# Patient Record
Sex: FEMALE | Race: WHITE | NOT HISPANIC OR LATINO | Employment: OTHER | URBAN - METROPOLITAN AREA
[De-identification: names, ages, dates, MRNs, and addresses within clinical notes are randomized per-mention and may not be internally consistent; named-entity substitution may affect disease eponyms.]

---

## 2017-01-27 ENCOUNTER — ALLSCRIPTS OFFICE VISIT (OUTPATIENT)
Dept: OTHER | Facility: OTHER | Age: 57
End: 2017-01-27

## 2017-05-10 ENCOUNTER — ALLSCRIPTS OFFICE VISIT (OUTPATIENT)
Dept: OTHER | Facility: OTHER | Age: 57
End: 2017-05-10

## 2017-08-02 ENCOUNTER — ALLSCRIPTS OFFICE VISIT (OUTPATIENT)
Dept: OTHER | Facility: OTHER | Age: 57
End: 2017-08-02

## 2017-11-14 ENCOUNTER — ALLSCRIPTS OFFICE VISIT (OUTPATIENT)
Dept: OTHER | Facility: OTHER | Age: 57
End: 2017-11-14
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
## 2018-01-11 NOTE — MISCELLANEOUS
Message  Pt called states she is shaky/ anxious- "i don't know if I'm having a nervous breakdown"    She is not suicidal- no HI  She has had increase in medical issues  States relationship with spouse has been stable   She does not want to go to hospital  Take 25 mg seroquel now and increase hs dose to 100 mg      Signatures   Electronically signed by : CHRISTEL Morrison; Jan 28 2016  2:08PM EST                       (Author)
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
## 2018-01-13 VITALS
DIASTOLIC BLOOD PRESSURE: 85 MMHG | WEIGHT: 148 LBS | BODY MASS INDEX: 29.84 KG/M2 | HEIGHT: 59 IN | SYSTOLIC BLOOD PRESSURE: 122 MMHG

## 2018-01-15 NOTE — PSYCH
Psych Med Mgmt    Appearance: was calm and cooperative, adequate hygiene and grooming and good eye contact  Observed mood: was dysphoric and anxious  Observed mood: affect appropriate  Speech: a normal rate  Thought processes: coherent/organized  Hallucinations: no hallucinations present  Thought Content: no delusions  Abnormal Thoughts: The patient has no suicidal thoughts and no homicidal thoughts  Orientation: The patient is oriented to person, place and time  Recent and Remote Memory: short term memory intact and long term memory intact  Attention Span And Concentration: concentration intact  Insight: Insight intact  Judgment: Her judgment was intact  Muscle Strength And Tone  Normal gait and station  Treatment Recommendations: Klonopin 1mg 1/2 po bid and 2po qhs  Neurontin 300 mg 3 po qhs  Cymbalta 30 mg po qd  Lamictal 100 mgpo bid  Seroquel 50 mg 1 -2 po qhs  Risks, Benefits And Possible Side Effects Of Medications: Risks, benefits, and possible side effects of medications explained to patient and patient verbalizes understanding  Discussed with patient the risks of sedation, respiratory depression, impairment of ability to drive and potential for abuse and addiction related to treatment with benzodiazepine medications  The patient understands risk of treatment with benzodiazepine medications, agrees to not drive if feels impaired and agrees to take medications as prescribed  She reports normal appetite, normal energy level, no weight change and normal number of sleep hours  Pt seen for follow up Bipolar Disorder/ Anxiety  Pt continues with anxiety and multiple stressors  She continues with pain from knee surgery and difficulty with ambulation  She states she was released from physical therapy in May  Her spouse is back in the house and she is handling things    She states he has not been drinking recently but has some memory issues which frustrates her  Overall she reports she is feeling better with cymblata  Some dry mouth as side effect but otherwise no side effects  DSM    Provisional Diagnosis: Bipolar Disorder  Anxiety Disorder  Assessment    1  Bipolar affective disorder, current episode moderate (296 80) (F31 9)   2  Anxiety (300 00) (F41 9)    Plan    1  ClonazePAM 1 MG Oral Tablet (KlonoPIN); 1/2 po bid and 2 po qhs prn   2  DULoxetine HCl - 30 MG Oral Capsule Delayed Release Particles (Cymbalta);   TAKE ONE CAPSULE BY MOUTH EVERY DAY    Review of Systems    Constitutional: No fever, no chills, feels well, no tiredness, no recent weight gain or loss  Cardiovascular: no complaints of slow or fast heart rate, no chest pain, no palpitations  Respiratory: no complaints of shortness of breath, no wheezing, no dyspnea on exertion  Gastrointestinal: no complaints of abdominal pain, no constipation, no nausea, no diarrhea, no vomiting  Genitourinary: no complaints of dysuria, no incontinence, no pelvic pain, no urinary frequency  Musculoskeletal: L knee pain  Integumentary: no complaints of skin rash, no itching, no dry skin  Neurological: no complaints of headache, no confusion, no numbness, no dizziness  Active Problems    1  Abdominal pain (789 00) (R10 9)   2  Abdominal pain (789 00) (R10 9)   3  Anxiety (300 00) (F41 9)   4  Bipolar affective disorder, current episode moderate (296 80) (F31 9)   5  Black tarry stools (578 1) (K92 1)   6  Breast cancer screening (V76 10) (Z12 39)   7  Depression (Symptom) (311)   8  Encounter for routine gynecological examination (V72 31) (Z01 419)   9  Encounter for screening mammogram for malignant neoplasm of breast (V76 12)   (Z12 31)   10  Headache (784 0) (R51)   11  Leukocytosis (288 60) (D72 829)   12  Nonspecific abnormal results of function study of thyroid (794 5) (R94 6)    Past Medical History    1  History of acute bronchitis (V12 69) (Z87 09)   2   History of ectopic pregnancy (V13 29) (Z87 59)   3  History of Toxic Shock Syndrome (040 82)    Allergies    1  No Known Drug Allergies    Current Meds   1  ClonazePAM 1 MG Oral Tablet; 1/2 po bid and 2 po qhs prn; Therapy: 40Qzz3332 to (Last Rx:10May2017) Ordered   2  DULoxetine HCl - 30 MG Oral Capsule Delayed Release Particles; TAKE ONE CAPSULE   BY MOUTH EVERY DAY; Therapy: 10PUC9459 to (Evaluate:03Upn7122)  Requested for: 30IIY3187; Last   Rx:53Qbu9315 Ordered   3  Gabapentin 300 MG Oral Capsule; 3 caps po qhs;   Therapy: 05RXB0978 to (Last Rx:10May2017)  Requested for: 49HRL3960 Ordered   4  LamoTRIgine 100 MG Oral Tablet; TAKE ONE (1) TABLET TWICE DAILY; Therapy: 02Sep2015 to (Oni Pritchett)  Requested for: 30XJJ4843; Last   Rx:10May2017 Ordered   5  Omeprazole 40 MG Oral Capsule Delayed Release; TAKE 1 CAPSULE DAILY; Therapy: 63ZTC4762 to (Evaluate:00Ybj3437); Last Rx:20Jun2013 Ordered   6  QUEtiapine Fumarate 50 MG Oral Tablet; 1-2 PO QHS; Therapy: 95Mor9916 to (Last Rx:10May2017)  Requested for: 95JLZ8601 Ordered    The medication list was reviewed and updated today  Family Psych History  Mother    1  Family history of depression (V17 0) (Z81 8)  Family History    2  Family history of Eczema   3  Family history of Reported Family History Of Allergies   4  Family history of Reported Family History Of Mental Illness (Not Retardation)    Social History    · Being A Social Drinker   · Current Every Day Smoker (305 1)   · Denied: History of Drug Use  The social history was reviewed and is unchanged  End of Encounter Meds    1  Omeprazole 40 MG Oral Capsule Delayed Release; TAKE 1 CAPSULE DAILY; Therapy: 47SQJ8256 to (Evaluate:23Ekq5855); Last Rx:20Jun2013 Ordered    2  ClonazePAM 1 MG Oral Tablet (KlonoPIN); 1/2 po bid and 2 po qhs prn; Therapy: 62Lfm9030 to (Last Rx:02Aug2017) Ordered   3   DULoxetine HCl - 30 MG Oral Capsule Delayed Release Particles (Cymbalta); TAKE   ONE CAPSULE BY MOUTH EVERY DAY; Therapy: 33WOT2633 to (Evaluate:68Vjh5721)  Requested for: 47Kgz1821; Last   Rx:67Tmo2311 Ordered   4  Gabapentin 300 MG Oral Capsule; 3 caps po qhs;   Therapy: 79UJX3659 to (Last Rx:10May2017)  Requested for: 49ZFX4963 Ordered   5  LamoTRIgine 100 MG Oral Tablet (LaMICtal); TAKE ONE (1) TABLET TWICE DAILY; Therapy: 07Xec1668 to (Shawn Cameron)  Requested for: 74ESL6156; Last   Rx:10May2017 Ordered   6  QUEtiapine Fumarate 50 MG Oral Tablet (SEROquel); 1-2 PO QHS; Therapy: 61Wfk2273 to (Last Rx:10May2017)  Requested for: 63IKL0228 Ordered    Signatures   Electronically signed by : Lore Okeefe, [facility];  Aug  2 2017  1:56PM EST                       (Author)    Electronically signed by : Bridger Arroyo MD; Aug  2 2017  5:19PM EST
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
## 2018-01-17 NOTE — PSYCH
Psych Med Mgmt    Appearance: was calm and cooperative  Observed mood: was dysphoric and anxious  Observed mood: affect appropriate  Speech: a normal rate  Thought processes: coherent/organized  Hallucinations: no hallucinations present  Thought Content: no delusions  Abnormal Thoughts: The patient has no suicidal thoughts and no homicidal thoughts  Orientation: The patient is oriented to person, place and time  Recent and Remote Memory: short term memory intact and long term memory intact  Attention Span And Concentration: concentration intact  Insight: Insight intact  Judgment: Her judgment was intact  Muscle Strength And Tone  Normal gait and station  Treatment Recommendations: Restart Seroquel 50 mg 1-2 po qhs  Lamictal 100 mg po bid  Neurontin 300 mg 3 po qhs  Klonopin 1 mg 1/2 po bid and 2 po qhs  Risks, Benefits And Possible Side Effects Of Medications: Risks, benefits, and possible side effects of medications explained to patient and patient verbalizes understanding  She reports normal appetite, normal energy level, no weight change and decrease in number of sleep hours   Pt seen for follow up Bipolar Disorder  Pt states she has had increase in medical issues- she had carpal tunnel surgery and has knee problems  Apparently she will likely be needing knee replacements  She states that she has not been sleeping well  Appetite is fair  Overall moods appear stable - no panic attacks recently  She is not as tearful and states she has not been drinking alcohol  She has a dog and cat and enjoys them  Vitals  Signs   Recorded: 02QTT3725 72:49TI   Systolic: 823  Diastolic: 80  Heart Rate: 80  Respiration: 16  Height: 4 ft 11 in  Weight: 146 lb   BMI Calculated: 29 49  BSA Calculated: 1 61    DSM    Provisional Diagnosis: Bipolar Disorder  Assessment    1  Bipolar affective disorder, current episode moderate (296 80) (F31 9)    Plan    1   Gabapentin 300 MG Oral Capsule; 3 caps po qhs    2  QUEtiapine Fumarate 50 MG Oral Tablet (SEROquel); 1-2 PO QHS   3  ClonazePAM 1 MG Oral Tablet (KlonoPIN); 1/2 po bid and 2 po qhs prn   4  LamoTRIgine 100 MG Oral Tablet (LaMICtal); TAKE ONE (1) TABLET TWICE   DAILY    Review of Systems    Constitutional: No fever, no chills, feels well, no tiredness, no recent weight gain or loss  Cardiovascular: no complaints of slow or fast heart rate, no chest pain, no palpitations  Respiratory: no complaints of shortness of breath, no wheezing, no dyspnea on exertion  Gastrointestinal: no complaints of abdominal pain, no constipation, no nausea, no diarrhea, no vomiting  Musculoskeletal: arthralgias and knee pain  Integumentary: no complaints of skin rash, no itching, no dry skin  Neurological: no complaints of headache, no confusion, no numbness, no dizziness  Active Problems    1  Abdominal pain (789 00) (R10 9)   2  Abdominal pain (789 00) (R10 9)   3  Anxiety (300 00) (F41 9)   4  Bipolar affective disorder, current episode moderate (296 80) (F31 9)   5  Black tarry stools (578 1) (K92 1)   6  Breast cancer screening (V76 10) (Z12 39)   7  Depression (Symptom) (311)   8  Encounter for routine gynecological examination (V72 31) (Z01 419)   9  Encounter for screening mammogram for malignant neoplasm of breast (V76 12)   (Z12 31)   10  Headache (784 0) (R51)   11  Leukocytosis (288 60) (D72 829)   12  Nonspecific abnormal results of function study of thyroid (794 5) (R94 6)    Past Medical History    1  History of acute bronchitis (V12 69) (Z87 09)   2  History of ectopic pregnancy (V13 29) (Z87 59)   3  History of Toxic Shock Syndrome (040 82)    Allergies    1  No Known Drug Allergies    Current Meds   1  ClonazePAM 1 MG Oral Tablet; 1/2 po bid and 2 po qhs prn; Therapy: 02Sep2015 to (Last Rx:03Jun2016) Ordered   2  Flagyl 500 MG Oral Tablet; take 1 tablet twice daily until finished Recorded   3   Gabapentin 300 MG Oral Capsule; 3 caps po qhs;   Therapy: 97JCL3301 to (Last ZQ:20WUO8854)  Requested for: 51JIM5960; Status: ACTIVE   - Renewal Denied Ordered   4  LamoTRIgine 100 MG Oral Tablet; TAKE ONE (1) TABLET TWICE DAILY; Therapy: 02Sep2015 to (Jimy García)  Requested for: 32TVI8165; Last   YD:54KLH4997 Ordered   5  Omeprazole 40 MG Oral Capsule Delayed Release; TAKE 1 CAPSULE DAILY; Therapy: 99HWI6718 to (Evaluate:96Pql3497); Last Rx:20Jun2013 Ordered   6  Percocet 5-325 MG Oral Tablet; TAKE 1 TABLET EVERY 4 TO 6 HOURS AS NEEDED   FOR PAIN Recorded    The medication list was reviewed and updated today  Family Psych History  Mother    1  Family history of depression (V17 0) (Z81 8)  Family History    2  Family history of Eczema   3  Family history of Reported Family History Of Allergies   4  Family history of Reported Family History Of Mental Illness (Not Retardation)    The family history was reviewed and updated today  Social History    · Being A Social Drinker   · Current Every Day Smoker (305 1)   · Denied: History of Drug Use  The social history was reviewed and updated today  The social history was reviewed and is unchanged  End of Encounter Meds    1  Omeprazole 40 MG Oral Capsule Delayed Release; TAKE 1 CAPSULE DAILY; Therapy: 24PSJ4415 to (Evaluate:18Jhi1139); Last Rx:20Jun2013 Ordered    2  Gabapentin 300 MG Oral Capsule; 3 caps po qhs;   Therapy: 54HMF2250 to (Last Rx:23Lop1532)  Requested for: 91Neb9899 Ordered    3  ClonazePAM 1 MG Oral Tablet (KlonoPIN); 1/2 po bid and 2 po qhs prn; Therapy: 02Sep2015 to (Last Rx:53Joc8652) Ordered   4  LamoTRIgine 100 MG Oral Tablet (LaMICtal); TAKE ONE (1) TABLET TWICE DAILY; Therapy: 02Sep2015 to (Evaluate:14Xve2998)  Requested for: 48Xaf1985; Last   Rx:71Fet5331 Ordered   5  QUEtiapine Fumarate 50 MG Oral Tablet (SEROquel); 1-2 PO QHS; Therapy: 32Xur4238 to (Last Rx:26Aug2016)  Requested for: 32Lwn4158 Ordered    6   Flagyl 500 MG Oral Tablet (MetroNIDAZOLE); take 1 tablet twice daily until finished   Recorded   7  Percocet 5-325 MG Oral Tablet (Oxycodone-Acetaminophen); TAKE 1 TABLET EVERY 4   TO 6 HOURS AS NEEDED FOR PAIN Recorded    Signatures   Electronically signed by : Lura Holstein, PAC;  Aug 26 2016  9:57AM EST                       (Author)    Electronically signed by : Teresa Painting MD; Aug 29 2016  4:44PM EST
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
## 2018-01-22 VITALS — BODY MASS INDEX: 31.85 KG/M2 | HEIGHT: 59 IN | RESPIRATION RATE: 16 BRPM | WEIGHT: 158 LBS

## 2018-02-09 ENCOUNTER — OFFICE VISIT (OUTPATIENT)
Dept: PSYCHIATRY | Facility: CLINIC | Age: 58
End: 2018-02-09
Payer: COMMERCIAL

## 2018-02-09 VITALS — BODY MASS INDEX: 30.23 KG/M2 | WEIGHT: 154 LBS | HEIGHT: 60 IN | RESPIRATION RATE: 16 BRPM

## 2018-02-09 DIAGNOSIS — G89.29 BILATERAL CHRONIC KNEE PAIN: ICD-10-CM

## 2018-02-09 DIAGNOSIS — M25.562 BILATERAL CHRONIC KNEE PAIN: ICD-10-CM

## 2018-02-09 DIAGNOSIS — F31.9 BIPOLAR AFFECTIVE DISORDER, CURRENT EPISODE MODERATE (HCC): Primary | ICD-10-CM

## 2018-02-09 DIAGNOSIS — F41.1 GENERALIZED ANXIETY DISORDER: ICD-10-CM

## 2018-02-09 DIAGNOSIS — M25.561 BILATERAL CHRONIC KNEE PAIN: ICD-10-CM

## 2018-02-09 PROCEDURE — 99214 OFFICE O/P EST MOD 30 MIN: CPT | Performed by: PHYSICIAN ASSISTANT

## 2018-02-09 RX ORDER — DULOXETIN HYDROCHLORIDE 60 MG/1
60 CAPSULE, DELAYED RELEASE ORAL DAILY
Qty: 30 CAPSULE | Refills: 3 | Status: SHIPPED | OUTPATIENT
Start: 2018-02-09 | End: 2018-07-12 | Stop reason: SDUPTHER

## 2018-02-09 RX ORDER — PANTOPRAZOLE SODIUM 40 MG/1
TABLET, DELAYED RELEASE ORAL
COMMUNITY
Start: 2015-03-23

## 2018-02-09 RX ORDER — FENOFIBRATE 120 MG/1
120 TABLET ORAL
COMMUNITY
Start: 2018-01-18

## 2018-02-09 RX ORDER — ERGOCALCIFEROL (VITAMIN D2) 10 MCG
1 TABLET ORAL
COMMUNITY

## 2018-02-09 RX ORDER — FLUTICASONE FUROATE AND VILANTEROL 100; 25 UG/1; UG/1
1 POWDER RESPIRATORY (INHALATION)
COMMUNITY

## 2018-02-09 RX ORDER — LAMOTRIGINE 100 MG/1
1 TABLET ORAL 2 TIMES DAILY
COMMUNITY
Start: 2015-09-02 | End: 2018-02-09 | Stop reason: SDUPTHER

## 2018-02-09 RX ORDER — QUETIAPINE FUMARATE 50 MG/1
TABLET, FILM COATED ORAL
COMMUNITY
Start: 2016-08-26 | End: 2018-02-09 | Stop reason: SDUPTHER

## 2018-02-09 RX ORDER — BIOTIN 1 MG
TABLET ORAL
COMMUNITY
Start: 2017-10-25

## 2018-02-09 RX ORDER — QUETIAPINE FUMARATE 50 MG/1
TABLET, FILM COATED ORAL
Qty: 60 TABLET | Refills: 3 | Status: SHIPPED | OUTPATIENT
Start: 2018-02-09 | End: 2018-07-02 | Stop reason: SDUPTHER

## 2018-02-09 RX ORDER — METOPROLOL SUCCINATE 25 MG/1
25 TABLET, EXTENDED RELEASE ORAL
COMMUNITY
Start: 2017-09-18

## 2018-02-09 RX ORDER — LAMOTRIGINE 100 MG/1
100 TABLET ORAL 2 TIMES DAILY
Qty: 60 TABLET | Refills: 3 | Status: SHIPPED | OUTPATIENT
Start: 2018-02-09 | End: 2018-07-02 | Stop reason: SDUPTHER

## 2018-02-09 RX ORDER — DULOXETIN HYDROCHLORIDE 60 MG/1
CAPSULE, DELAYED RELEASE ORAL DAILY
COMMUNITY
Start: 2017-05-10 | End: 2018-02-09 | Stop reason: SDUPTHER

## 2018-02-09 RX ORDER — ATORVASTATIN CALCIUM 20 MG/1
20 TABLET, FILM COATED ORAL
COMMUNITY
Start: 2018-01-18 | End: 2021-06-17

## 2018-02-09 RX ORDER — CLONAZEPAM 1 MG/1
TABLET ORAL
Qty: 60 TABLET | Refills: 3 | Status: SHIPPED | OUTPATIENT
Start: 2018-02-09 | End: 2018-03-28 | Stop reason: SDUPTHER

## 2018-02-09 RX ORDER — GABAPENTIN 300 MG/1
CAPSULE ORAL
Qty: 90 CAPSULE | Refills: 3 | Status: SHIPPED | OUTPATIENT
Start: 2018-02-09 | End: 2018-07-12 | Stop reason: SDUPTHER

## 2018-02-09 RX ORDER — GABAPENTIN 300 MG/1
CAPSULE ORAL
COMMUNITY
Start: 2015-09-02 | End: 2018-02-09 | Stop reason: SDUPTHER

## 2018-02-09 RX ORDER — CLONAZEPAM 1 MG/1
TABLET ORAL
COMMUNITY
Start: 2015-09-02 | End: 2018-02-09 | Stop reason: SDUPTHER

## 2018-02-09 NOTE — PSYCH
PROGRESS NOTE        [facility]      Name and Date of Birth:  Jaxon Ariza 61 y o  1960    Date of Visit: 02/09/18    SUBJECTIVE:  Pt seen for follow up and continues with pain  Pt has been continuing with knee pain since her replacement 14 months ago  She has had increased difficulty with ambulation and doing things she wants to do  Her moods have been more dysphoric and depressed about this  She denies suicidal ideation, intent or plan at present, has no suicidal ideation, intent or plan at present  She denies any auditory hallucinations and visual hallucinations, denies any other delusional thinking, denies any delusional thinking  She denies any side effects from medications    HPI ROS Appetite Changes and Sleep: normal appetite, normal sleep    Review Of Systems:      Constitutional Negative   ENT Negative   Cardiovascular Negative   Respiratory Negative   Gastrointestinal Negative   Genitourinary Negative   Musculoskeletal Knee pain   Integumentary Negative   Neurological Negative   Endocrine Negative   Other Symptoms Negative and None       Laboratory Results: No results found for this or any previous visit  Substance Abuse History:    History   Drug use: Unknown       Family Psychiatric History:     No family history on file  The following portions of the patient's history were reviewed and updated as appropriate: past family history, past medical history, past social history, past surgical history and problem list     Social History     Social History    Marital status: Single     Spouse name: N/A    Number of children: N/A    Years of education: N/A     Occupational History    Not on file       Social History Main Topics    Smoking status: Not on file    Smokeless tobacco: Not on file    Alcohol use Not on file    Drug use: Unknown    Sexual activity: Not on file     Other Topics Concern    Not on file     Social History Narrative    No narrative on file     Social History     Social History Narrative    No narrative on file        Social History    None             OBJECTIVE:     Mental Status Evaluation:    Appearance age appropriate, casually dressed   Behavior cooperative   Speech normal volume, normal pitch   Mood dysthymic   Affect Mood congruent   Thought Processes logical   Associations intact associations   Thought Content normal   Perceptual Disturbances: none   Abnormal Thoughts  Risk Potential Suicidal ideation - None  Homicidal ideation - None     Orientation oriented to person, place, time/date and situation   Memory recent and remote memory grossly intact   Cosciousness alert and awake   Attention Span attention span and concentration are age appropriate   Intellect Appears to be of Average Intelligence   Insight age appropriate    Judgement good    Muscle Strength and  Gait muscle strength and tone were normal   Language no difficulty naming common objects   Fund of Knowledge displays adequate knowledge of current events   Pain Knee pain   Pain Scale 7       Assessment/Plan:       Diagnoses and all orders for this visit:    Bipolar affective disorder, current episode moderate (HCC)    Generalized anxiety disorder    Bilateral chronic knee pain    Other orders  -     clonazePAM (KlonoPIN) 1 mg tablet; Take by mouth  -     DULoxetine (CYMBALTA) 60 mg delayed release capsule; Take by mouth daily  -     gabapentin (NEURONTIN) 300 mg capsule; Take by mouth  -     lamoTRIgine (LaMICtal) 100 mg tablet; Take 1 tablet by mouth 2 (two) times a day  -     pantoprazole (PROTONIX) 40 mg tablet; Take by mouth  -     QUEtiapine (SEROquel) 50 mg tablet; Take by mouth  -     atorvastatin (LIPITOR) 20 mg tablet; Take 20 mg by mouth  -     Cholecalciferol (VITAMIN D3) 1000 units CAPS; TAKE 1 CAPSULE (50,000 UNITS TOTAL) BY MOUTH ONCE A WEEK FOR 12 DOSES    -     fenofibrate (FENOGLIDE) 120 MG TABS; Take 120 mg by mouth  - fluticasone furoate-vilanterol (BREO ELLIPTA); Inhale 1 puff  -     metoprolol succinate (TOPROL-XL) 25 mg 24 hr tablet; Take 25 mg by mouth  -     Multiple Vitamin (DAILY VALUE MULTIVITAMIN) TABS; Take 1 tablet by mouth          Treatment Recommendations/Precautions:    Seroquel 50 mg 1-2 po qhs  Klonopin 1/2 bid and 1 qhs  Cymbalta 60 qd  Neurontin 300 3 qhs  Lamictal 100 mg bid    Risks/Benefits      Risks, Benefits And Possible Side Effects Of Medications:    Risks, benefits, and possible side effects of medications explained to patient and patient verbalizes understanding and agreement for treatment      Controlled Medication Discussion:     Not applicable    Psychotherapy Provided:     Individual psychotherapy provided: No

## 2018-03-28 ENCOUNTER — TELEPHONE (OUTPATIENT)
Dept: PSYCHIATRY | Facility: CLINIC | Age: 58
End: 2018-03-28

## 2018-03-28 DIAGNOSIS — F31.32 BIPOLAR AFFECTIVE DISORDER, CURRENTLY DEPRESSED, MODERATE (HCC): ICD-10-CM

## 2018-03-28 DIAGNOSIS — F41.1 GENERALIZED ANXIETY DISORDER: ICD-10-CM

## 2018-03-28 RX ORDER — CLONAZEPAM 1 MG/1
TABLET ORAL
Qty: 90 TABLET | Refills: 2 | OUTPATIENT
Start: 2018-03-28 | End: 2018-07-12 | Stop reason: SDUPTHER

## 2018-03-28 NOTE — TELEPHONE ENCOUNTER
That was my mistake when we transferred to [facility]-  I called in the correct rx for 1/2 twice a day and 2 at hs for klonopin today #90 with 2 refill  Can you let her know and I apologize

## 2018-07-02 DIAGNOSIS — F31.9 BIPOLAR AFFECTIVE DISORDER, CURRENT EPISODE MODERATE (HCC): ICD-10-CM

## 2018-07-02 DIAGNOSIS — F41.1 GENERALIZED ANXIETY DISORDER: ICD-10-CM

## 2018-07-02 RX ORDER — LAMOTRIGINE 100 MG/1
100 TABLET ORAL 2 TIMES DAILY
Qty: 60 TABLET | Refills: 3 | Status: SHIPPED | OUTPATIENT
Start: 2018-07-02 | End: 2018-07-12 | Stop reason: SDUPTHER

## 2018-07-02 RX ORDER — QUETIAPINE FUMARATE 50 MG/1
TABLET, FILM COATED ORAL
Qty: 60 TABLET | Refills: 3 | Status: SHIPPED | OUTPATIENT
Start: 2018-07-02 | End: 2018-07-12 | Stop reason: SDUPTHER

## 2018-07-12 ENCOUNTER — OFFICE VISIT (OUTPATIENT)
Dept: PSYCHIATRY | Facility: CLINIC | Age: 58
End: 2018-07-12
Payer: COMMERCIAL

## 2018-07-12 DIAGNOSIS — F31.32 BIPOLAR AFFECTIVE DISORDER, CURRENTLY DEPRESSED, MODERATE (HCC): Primary | ICD-10-CM

## 2018-07-12 DIAGNOSIS — F31.9 BIPOLAR AFFECTIVE DISORDER, CURRENT EPISODE MODERATE (HCC): ICD-10-CM

## 2018-07-12 DIAGNOSIS — F41.1 GENERALIZED ANXIETY DISORDER: ICD-10-CM

## 2018-07-12 PROCEDURE — 99214 OFFICE O/P EST MOD 30 MIN: CPT | Performed by: PHYSICIAN ASSISTANT

## 2018-07-12 RX ORDER — OXYCODONE HYDROCHLORIDE 5 MG/1
TABLET ORAL
Refills: 0 | COMMUNITY
Start: 2018-07-06 | End: 2018-10-12

## 2018-07-12 RX ORDER — CHOLECALCIFEROL (VITAMIN D3) 125 MCG
CAPSULE ORAL
Refills: 3 | COMMUNITY
Start: 2018-05-08 | End: 2021-06-17 | Stop reason: SDUPTHER

## 2018-07-12 RX ORDER — GABAPENTIN 300 MG/1
CAPSULE ORAL
Qty: 90 CAPSULE | Refills: 5 | Status: SHIPPED | OUTPATIENT
Start: 2018-07-12 | End: 2018-10-30 | Stop reason: SDUPTHER

## 2018-07-12 RX ORDER — DULOXETIN HYDROCHLORIDE 60 MG/1
60 CAPSULE, DELAYED RELEASE ORAL DAILY
Qty: 30 CAPSULE | Refills: 5 | Status: SHIPPED | OUTPATIENT
Start: 2018-07-12 | End: 2018-10-12 | Stop reason: SDUPTHER

## 2018-07-12 RX ORDER — TRAMADOL HYDROCHLORIDE 50 MG/1
50 TABLET ORAL EVERY 6 HOURS PRN
Refills: 0 | COMMUNITY
Start: 2018-06-08 | End: 2021-06-17

## 2018-07-12 RX ORDER — LINACLOTIDE 145 UG/1
CAPSULE, GELATIN COATED ORAL
Refills: 1 | COMMUNITY
Start: 2018-07-10

## 2018-07-12 RX ORDER — CLONAZEPAM 1 MG/1
TABLET ORAL
Qty: 90 TABLET | Refills: 2 | Status: SHIPPED | OUTPATIENT
Start: 2018-07-12 | End: 2018-10-12 | Stop reason: SDUPTHER

## 2018-07-12 RX ORDER — LAMOTRIGINE 100 MG/1
100 TABLET ORAL 2 TIMES DAILY
Qty: 60 TABLET | Refills: 5 | Status: SHIPPED | OUTPATIENT
Start: 2018-07-12 | End: 2018-10-12 | Stop reason: SDUPTHER

## 2018-07-12 RX ORDER — QUETIAPINE FUMARATE 50 MG/1
50-100 TABLET, FILM COATED ORAL
Qty: 60 TABLET | Refills: 5 | Status: SHIPPED | OUTPATIENT
Start: 2018-07-12 | End: 2018-10-12 | Stop reason: SDUPTHER

## 2018-07-12 NOTE — PSYCH
PROGRESS NOTE        [facility]      Name and Date of Birth:  Siomara Peter 60 y o  1960    Date of Visit: 07/12/18    SUBJECTIVE:  Pt seen for follow up  Pt unfortunately had a fall when walking the dog and has arm fracture and is in a cast and sling  She is seeing a new ortho doctor at [place] [address] and saw a new doctor for her knee  She has been having increased pain and issues since her TKR  She reports the hardware is coming apart  She denies suicidal ideation, intent or plan at present, has no suicidal ideation, intent or plan at present  She denies any auditory hallucinations and visual hallucinations, denies any other delusional thinking, denies any delusional thinking  She denies any side effects from medications    HPI ROS Appetite Changes and Sleep: normal appetite, normal sleep    Review Of Systems:      Constitutional Negative   ENT Negative   Cardiovascular Negative   Respiratory Negative   Gastrointestinal Negative   Genitourinary Negative    Left arm sling/ left knee pain       Neurological Negative   Endocrine Negative   Other Symptoms Negative and None       Laboratory Results: No results found for this or any previous visit  Substance Abuse History:    History   Drug Use No       Family Psychiatric History:     No family history on file  The following portions of the patient's history were reviewed and updated as appropriate: past family history, past medical history, past social history, past surgical history and problem list     Social History     Social History    Marital status: Single     Spouse name: N/A    Number of children: N/A    Years of education: N/A     Occupational History    Not on file       Social History Main Topics    Smoking status: Current Every Day Smoker     Packs/day: 1 50    Smokeless tobacco: Never Used    Alcohol use No    Drug use: No    Sexual activity: Not on file     Other Topics Concern    Not on file     Social History Narrative    No narrative on file     Social History     Social History Narrative    No narrative on file        Social History     Tobacco History     Smoking Status  Current Every Day Smoker Smoking Frequency  1 5 packs/day    Smokeless Tobacco Use  Never Used          Alcohol History     Alcohol Use Status  No          Drug Use     Drug Use Status  No          Sexual Activity     Sexually Active  Not Asked          Activities of Daily Living    Not Asked                     OBJECTIVE:     Mental Status Evaluation:    Appearance age appropriate, casually dressed   Behavior pleasant, cooperative   Speech normal volume, normal pitch   Mood euthymic   Affect appropriate   Thought Processes logical   Associations intact associations   Thought Content normal   Perceptual Disturbances: none   Abnormal Thoughts  Risk Potential Suicidal ideation - None  Homicidal ideation - None  Potential for aggression - No   Orientation oriented to person, place, time/date and situation   Memory recent and remote memory grossly intact   Cosciousness alert and awake   Attention Span attention span and concentration are age appropriate   Intellect Appears to be of Average Intelligence   Insight age appropriate    Judgement good    Muscle Strength and  Gait muscle strength and tone were normal   Language no difficulty naming common objects   Fund of Knowledge displays adequate knowledge of current events   Pain nmoderate   Pain Scale moderate       Assessment/Plan:       Diagnoses and all orders for this visit:    Bipolar affective disorder, currently depressed, moderate (HCC)  -     clonazePAM (KlonoPIN) 1 mg tablet; 1/2 po bid and 2 po qhs prn    Generalized anxiety disorder  -     clonazePAM (KlonoPIN) 1 mg tablet; 1/2 po bid and 2 po qhs prn  -     gabapentin (NEURONTIN) 300 mg capsule; 3 po qhs  -     lamoTRIgine (LaMICtal) 100 mg tablet;  Take 1 tablet (100 mg total) by mouth 2 (two) times a day  -     DULoxetine (CYMBALTA) 60 mg delayed release capsule; Take 1 capsule (60 mg total) by mouth daily  -     QUEtiapine (SEROquel) 50 mg tablet; Take 1-2 tablets ( mg total) by mouth daily at bedtime    Bipolar affective disorder, current episode moderate (HCC)  -     gabapentin (NEURONTIN) 300 mg capsule; 3 po qhs  -     lamoTRIgine (LaMICtal) 100 mg tablet; Take 1 tablet (100 mg total) by mouth 2 (two) times a day  -     DULoxetine (CYMBALTA) 60 mg delayed release capsule; Take 1 capsule (60 mg total) by mouth daily  -     QUEtiapine (SEROquel) 50 mg tablet; Take 1-2 tablets ( mg total) by mouth daily at bedtime    Other orders  -     oxyCODONE (ROXICODONE) 5 mg immediate release tablet; TAKE 1 TABLET BY MOUTH EVERY 4 TO 6 HOURS AS DIRECTED  -     traMADol (ULTRAM) 50 mg tablet; Take 50 mg by mouth every 6 (six) hours as needed  -     LINZESS 145 MCG CAPS; 1 (ONE) CAPSULE 30 MIN PRIOR TO MEAL  -     D-3-5 5000 units capsule; TAKE 1 CAPSULE (5,000 UNITS TOTAL) BY MOUTH DAILY  Treatment Recommendations/Precautions:  Seroquel 50 mg 1-2 po qhs  Cymbalta 60 mg po qd  Klonopin 1 mg 1/2 po bid and 2 po qhs  Neurontin 300 mg  3 po qhs  Lamictal 100 mg po bid    Continue current medications    Risks/Benefits      Risks, Benefits And Possible Side Effects Of Medications:    Risks, benefits, and possible side effects of medications explained to patient and patient verbalizes understanding and agreement for treatment      Controlled Medication Discussion:     Taking as prescribed    Psychotherapy Provided:     Individual psychotherapy provided: No

## 2018-10-12 ENCOUNTER — OFFICE VISIT (OUTPATIENT)
Dept: PSYCHIATRY | Facility: CLINIC | Age: 58
End: 2018-10-12
Payer: COMMERCIAL

## 2018-10-12 VITALS — WEIGHT: 156 LBS | BODY MASS INDEX: 30.47 KG/M2 | RESPIRATION RATE: 16 BRPM

## 2018-10-12 DIAGNOSIS — F41.1 GENERALIZED ANXIETY DISORDER: ICD-10-CM

## 2018-10-12 DIAGNOSIS — F31.9 BIPOLAR AFFECTIVE DISORDER, CURRENT EPISODE MODERATE (HCC): ICD-10-CM

## 2018-10-12 DIAGNOSIS — F31.32 BIPOLAR AFFECTIVE DISORDER, CURRENTLY DEPRESSED, MODERATE (HCC): Primary | ICD-10-CM

## 2018-10-12 PROCEDURE — 99214 OFFICE O/P EST MOD 30 MIN: CPT | Performed by: PHYSICIAN ASSISTANT

## 2018-10-12 RX ORDER — QUETIAPINE FUMARATE 50 MG/1
50-100 TABLET, FILM COATED ORAL
Qty: 60 TABLET | Refills: 3 | Status: SHIPPED | OUTPATIENT
Start: 2018-10-12 | End: 2018-10-30 | Stop reason: SDUPTHER

## 2018-10-12 RX ORDER — CLONAZEPAM 1 MG/1
TABLET ORAL
Qty: 90 TABLET | Refills: 3 | Status: SHIPPED | OUTPATIENT
Start: 2018-10-12 | End: 2018-10-30 | Stop reason: SDUPTHER

## 2018-10-12 RX ORDER — LAMOTRIGINE 100 MG/1
100 TABLET ORAL 2 TIMES DAILY
Qty: 60 TABLET | Refills: 3 | Status: SHIPPED | OUTPATIENT
Start: 2018-10-12 | End: 2018-10-30 | Stop reason: SDUPTHER

## 2018-10-12 RX ORDER — ASCORBIC ACID 500 MG
TABLET ORAL
Refills: 0 | COMMUNITY
Start: 2018-09-17

## 2018-10-12 RX ORDER — CELECOXIB 200 MG/1
CAPSULE ORAL
COMMUNITY

## 2018-10-12 RX ORDER — DULOXETIN HYDROCHLORIDE 60 MG/1
60 CAPSULE, DELAYED RELEASE ORAL DAILY
Qty: 30 CAPSULE | Refills: 3 | Status: SHIPPED | OUTPATIENT
Start: 2018-10-12 | End: 2018-10-30 | Stop reason: SDUPTHER

## 2018-10-12 RX ORDER — ALBUTEROL SULFATE 90 UG/1
2 POWDER, METERED RESPIRATORY (INHALATION) EVERY 4 HOURS PRN
Refills: 3 | COMMUNITY
Start: 2018-07-11

## 2018-10-12 NOTE — PSYCH
PROGRESS NOTE        [facility]      Name and Date of Birth:  Eva Fischer 60 y o  1960    Date of Visit: 10/12/18    SUBJECTIVE:  Patient seen for follow-up  Was Seeing ortho now at [facility] for treatment of knee  States she was there at end of September and that he no longer wants to see her  She is frustrated with this and is uncertain where to go  She is dysphoric about her medical issues  States she is not sleeping well at night due to pain in knee and arm  She has an adequate appetite and is fearful as she has been gaining weight  Patient's weight has only been increased by 2 lb over the past eight months  She has been trying to watch her appetite and does have a gym membership however due to her physical limitations has been unable to attend regularly  She states that she has been attending a Bible study has been enjoying that  She denies suicidal ideation, intent or plan at present, has no suicidal ideation, intent or plan at present  She denies any auditory hallucinations and visual hallucinations, denies any other delusional thinking, denies any delusional thinking  She denies any side effects from medications    HPI ROS Appetite Changes and Sleep: normal appetite, decreased sleep    Review Of Systems:      Constitutional Negative   ENT Negative   Cardiovascular Negative   Respiratory Negative   Gastrointestinal Negative   Genitourinary Negative   Musculoskeletal Negative   Integumentary Negative   Neurological Negative   Endocrine Negative   Other Symptoms Negative and None       Laboratory Results: No results found for this or any previous visit  Substance Abuse History:    History   Drug Use No       Family Psychiatric History:     No family history on file      The following portions of the patient's history were reviewed and updated as appropriate: past family history, past medical history, past social history, past surgical history and problem list     Social History     Social History    Marital status: Single     Spouse name: N/A    Number of children: N/A    Years of education: N/A     Occupational History    Not on file       Social History Main Topics    Smoking status: Current Every Day Smoker     Packs/day: 1 50    Smokeless tobacco: Never Used    Alcohol use No    Drug use: No    Sexual activity: Not on file     Other Topics Concern    Not on file     Social History Narrative    No narrative on file     Social History     Social History Narrative    No narrative on file        Social History     Tobacco History     Smoking Status  Current Every Day Smoker Smoking Frequency  1 5 packs/day    Smokeless Tobacco Use  Never Used          Alcohol History     Alcohol Use Status  No          Drug Use     Drug Use Status  No          Sexual Activity     Sexually Active  Not Asked          Activities of Daily Living    Not Asked                     OBJECTIVE:     Mental Status Evaluation:    Appearance age appropriate, casually dressed   Behavior pleasant, cooperative   Speech normal volume, normal pitch   Mood dysthymic   Affect Anxious,congruent   Thought Processes logical   Associations intact associations   Thought Content normal   Perceptual Disturbances: none   Abnormal Thoughts  Risk Potential Suicidal ideation - None  Homicidal ideation - None  Potential for aggression - No   Orientation oriented to person, place, time/date and situation   Memory recent and remote memory grossly intact   Cosciousness alert and awake   Attention Span attention span and concentration are age appropriate   Intellect Appears to be of Average Intelligence   Insight age appropriate    Judgement good    Muscle Strength and  Gait muscle strength and tone were normal   Language no difficulty naming common objects   Fund of Knowledge displays adequate knowledge of current events   Pain none   Pain Scale 0       Assessment/Plan:       Diagnoses and all orders for this visit:    Bipolar affective disorder, currently depressed, moderate (HCC)  -     clonazePAM (KlonoPIN) 1 mg tablet; 1/2 po bid and 2 po qhs prn    Generalized anxiety disorder  -     clonazePAM (KlonoPIN) 1 mg tablet; 1/2 po bid and 2 po qhs prn  -     DULoxetine (CYMBALTA) 60 mg delayed release capsule; Take 1 capsule (60 mg total) by mouth daily  -     QUEtiapine (SEROquel) 50 mg tablet; Take 1-2 tablets ( mg total) by mouth daily at bedtime  -     lamoTRIgine (LaMICtal) 100 mg tablet; Take 1 tablet (100 mg total) by mouth 2 (two) times a day    Bipolar affective disorder, current episode moderate (HCC)  -     DULoxetine (CYMBALTA) 60 mg delayed release capsule; Take 1 capsule (60 mg total) by mouth daily  -     QUEtiapine (SEROquel) 50 mg tablet; Take 1-2 tablets ( mg total) by mouth daily at bedtime  -     lamoTRIgine (LaMICtal) 100 mg tablet; Take 1 tablet (100 mg total) by mouth 2 (two) times a day    Other orders  -     ascorbic acid (VITAMIN C) 500 mg tablet; TAKE 1 TABLET BY MOUTH EVERY DAY FOR 50 DAYS  -     PROAIR RESPICLICK 999 (90 Base) MCG/ACT AEPB; Inhale 2 puffs every 4 (four) hours as needed  -     celecoxib (CeleBREX) 200 mg capsule; TAKE ONE CAPSULE BY MOUTH TWICE A DAY          Treatment Recommendations/Precautions:  Seroquel 50 mg 1-2 tabs by mouth at bedtime  Cymbalta 60 mg p o  daily  Klonopin 1 mg 1/2 a tab p  o  b i d  and two p o  q h s  Neurontin 300 mg three tabs p o  q h s  Lamictal 100 mg p o  b i d  Continue current medications    Risks/Benefits      Risks, Benefits And Possible Side Effects Of Medications:    Risks, benefits, and possible side effects of medications explained to patient and patient verbalizes understanding and agreement for treatment      Controlled Medication Discussion:     Taking as prescribed    Psychotherapy Provided:     Individual psychotherapy provided: No

## 2018-10-29 ENCOUNTER — TELEPHONE (OUTPATIENT)
Dept: PSYCHIATRY | Facility: CLINIC | Age: 58
End: 2018-10-29

## 2018-10-30 DIAGNOSIS — F41.1 GENERALIZED ANXIETY DISORDER: ICD-10-CM

## 2018-10-30 DIAGNOSIS — F31.32 BIPOLAR AFFECTIVE DISORDER, CURRENTLY DEPRESSED, MODERATE (HCC): ICD-10-CM

## 2018-10-30 DIAGNOSIS — F31.9 BIPOLAR AFFECTIVE DISORDER, CURRENT EPISODE MODERATE (HCC): ICD-10-CM

## 2018-10-30 RX ORDER — GABAPENTIN 300 MG/1
CAPSULE ORAL
Qty: 270 CAPSULE | Refills: 1 | Status: SHIPPED | OUTPATIENT
Start: 2018-10-30 | End: 2019-06-19 | Stop reason: SDUPTHER

## 2018-10-30 RX ORDER — CLONAZEPAM 1 MG/1
TABLET ORAL
Qty: 270 TABLET | Refills: 0 | Status: SHIPPED | OUTPATIENT
Start: 2018-10-30 | End: 2019-01-16 | Stop reason: SDUPTHER

## 2018-10-30 RX ORDER — QUETIAPINE FUMARATE 50 MG/1
50-100 TABLET, FILM COATED ORAL
Qty: 180 TABLET | Refills: 1 | Status: SHIPPED | OUTPATIENT
Start: 2018-10-30 | End: 2019-06-19 | Stop reason: SDUPTHER

## 2018-10-30 RX ORDER — DULOXETIN HYDROCHLORIDE 60 MG/1
60 CAPSULE, DELAYED RELEASE ORAL DAILY
Qty: 90 CAPSULE | Refills: 1 | Status: SHIPPED | OUTPATIENT
Start: 2018-10-30 | End: 2019-06-19 | Stop reason: SDUPTHER

## 2018-10-30 RX ORDER — LAMOTRIGINE 100 MG/1
100 TABLET ORAL 2 TIMES DAILY
Qty: 180 TABLET | Refills: 1 | Status: SHIPPED | OUTPATIENT
Start: 2018-10-30 | End: 2019-06-19 | Stop reason: SDUPTHER

## 2019-01-16 ENCOUNTER — OFFICE VISIT (OUTPATIENT)
Dept: PSYCHIATRY | Facility: CLINIC | Age: 59
End: 2019-01-16
Payer: COMMERCIAL

## 2019-01-16 VITALS — WEIGHT: 160 LBS | RESPIRATION RATE: 16 BRPM | HEIGHT: 60 IN | BODY MASS INDEX: 31.41 KG/M2

## 2019-01-16 DIAGNOSIS — F31.32 BIPOLAR AFFECTIVE DISORDER, CURRENTLY DEPRESSED, MODERATE (HCC): Primary | ICD-10-CM

## 2019-01-16 DIAGNOSIS — F41.1 GENERALIZED ANXIETY DISORDER: ICD-10-CM

## 2019-01-16 PROCEDURE — 99214 OFFICE O/P EST MOD 30 MIN: CPT | Performed by: PHYSICIAN ASSISTANT

## 2019-01-16 RX ORDER — CLONAZEPAM 1 MG/1
TABLET ORAL
Qty: 270 TABLET | Refills: 0 | Status: SHIPPED | OUTPATIENT
Start: 2019-01-16 | End: 2019-06-19 | Stop reason: SDUPTHER

## 2019-01-16 NOTE — PSYCH
PROGRESS NOTE        [facility]      Name and Date of Birth:  Alexis Lobo 60 y o  1960    Date of Visit: 01/16/19    SUBJECTIVE:  Pt seen for follow up  Continues with stressors with spouse  States he continues to drink but has been working  Physically she has been having upper respiratory symptoms for about two and half months and has been seen by pulmonology and her family doctor  She has been on antibiotics as well as nebulizer treatments  She states that realizes caused her to be more anxious and shaky  She also reports that her depression has been worse but states that she stopped her Cymbalta about a week ago due to thoughts that this was upsetting her stomach  She does plan to restart the Cymbalta  Sleep has been adequate  She does state that her appetite is decreased during the day and she continues to eat mostly at night  She denies suicidal ideation, intent or plan at present, has no suicidal ideation, intent or plan at present  She denies any auditory hallucinations and visual hallucinations, denies any other delusional thinking, denies any delusional thinking  She denies any side effects from medications    HPI ROS Appetite Changes and Sleep: normal appetite, normal sleep    Review Of Systems:      Constitutional Negative   ENT Negative   Cardiovascular Negative   Respiratory Negative   Gastrointestinal Negative   Genitourinary Negative   Musculoskeletal Negative   Integumentary Negative   Neurological Negative   Endocrine Negative   Other Symptoms Negative and None       Laboratory Results: No results found for this or any previous visit  Substance Abuse History:    History   Drug Use No       Family Psychiatric History:     No family history on file      The following portions of the patient's history were reviewed and updated as appropriate: past family history, past medical history, past social history, past surgical history and problem list     Social History     Social History    Marital status: Single     Spouse name: N/A    Number of children: N/A    Years of education: N/A     Occupational History    Not on file       Social History Main Topics    Smoking status: Current Every Day Smoker     Packs/day: 1 50    Smokeless tobacco: Never Used    Alcohol use No    Drug use: No    Sexual activity: Not on file     Other Topics Concern    Not on file     Social History Narrative    No narrative on file     Social History     Social History Narrative    No narrative on file        Social History     Tobacco History     Smoking Status  Current Every Day Smoker Smoking Frequency  1 5 packs/day    Smokeless Tobacco Use  Never Used          Alcohol History     Alcohol Use Status  No          Drug Use     Drug Use Status  No          Sexual Activity     Sexually Active  Not Asked          Activities of Daily Living    Not Asked                     OBJECTIVE:     Mental Status Evaluation:    Appearance age appropriate, casually dressed   Behavior pleasant, cooperative   Speech normal volume, normal pitch   Mood Depressed   Affect Congruent   Thought Processes logical   Associations intact associations   Thought Content normal   Perceptual Disturbances: none   Abnormal Thoughts  Risk Potential Suicidal ideation - None  Homicidal ideation - None  Potential for aggression - No   Orientation oriented to person, place, time/date and situation   Memory recent and remote memory grossly intact   Cosciousness alert and awake   Attention Span attention span and concentration are age appropriate   Intellect Appears to be of Average Intelligence   Insight age appropriate    Judgement good    Muscle Strength and  Gait muscle strength and tone were normal   Language no difficulty naming common objects   Fund of Knowledge displays adequate knowledge of current events   Pain none   Pain Scale 0       Assessment/Plan:       Diagnoses and all orders for this visit:    Bipolar affective disorder, currently depressed, moderate (HCC)  -     clonazePAM (KlonoPIN) 1 mg tablet; 1/2 po bid and 2 po qhs prn    Generalized anxiety disorder  -     clonazePAM (KlonoPIN) 1 mg tablet; 1/2 po bid and 2 po qhs prn          Treatment Recommendations/Precautions:  Klonopin 1 mg 1/2 bid and 2 po qhs prn  Seroquel 50 - 100 mg po qhs  Lamictal 100 mg po bid  Neurontin 300 mg 3 po qhs    Continue current medications    Risks/Benefits      Risks, Benefits And Possible Side Effects Of Medications:    Risks, benefits, and possible side effects of medications explained to patient and patient verbalizes understanding and agreement for treatment      Controlled Medication Discussion:     Taking as prescribed    Psychotherapy Provided:     Individual psychotherapy provided: No

## 2019-04-15 ENCOUNTER — OFFICE VISIT (OUTPATIENT)
Dept: PSYCHIATRY | Facility: CLINIC | Age: 59
End: 2019-04-15
Payer: COMMERCIAL

## 2019-04-15 DIAGNOSIS — F31.32 BIPOLAR AFFECTIVE DISORDER, CURRENTLY DEPRESSED, MODERATE (HCC): Primary | ICD-10-CM

## 2019-04-15 DIAGNOSIS — F41.1 GENERALIZED ANXIETY DISORDER: ICD-10-CM

## 2019-04-15 PROCEDURE — 99214 OFFICE O/P EST MOD 30 MIN: CPT | Performed by: PHYSICIAN ASSISTANT

## 2019-04-19 ENCOUNTER — TELEPHONE (OUTPATIENT)
Dept: PSYCHIATRY | Facility: CLINIC | Age: 59
End: 2019-04-19

## 2019-06-19 ENCOUNTER — OFFICE VISIT (OUTPATIENT)
Dept: PSYCHIATRY | Facility: CLINIC | Age: 59
End: 2019-06-19
Payer: COMMERCIAL

## 2019-06-19 DIAGNOSIS — F41.1 GENERALIZED ANXIETY DISORDER: ICD-10-CM

## 2019-06-19 DIAGNOSIS — F31.32 BIPOLAR AFFECTIVE DISORDER, CURRENTLY DEPRESSED, MODERATE (HCC): Primary | ICD-10-CM

## 2019-06-19 DIAGNOSIS — F31.9 BIPOLAR AFFECTIVE DISORDER, CURRENT EPISODE MODERATE (HCC): ICD-10-CM

## 2019-06-19 PROCEDURE — 99214 OFFICE O/P EST MOD 30 MIN: CPT | Performed by: PHYSICIAN ASSISTANT

## 2019-06-19 RX ORDER — DULOXETIN HYDROCHLORIDE 60 MG/1
60 CAPSULE, DELAYED RELEASE ORAL DAILY
Qty: 90 CAPSULE | Refills: 1 | Status: SHIPPED | OUTPATIENT
Start: 2019-06-19 | End: 2019-11-20 | Stop reason: SDUPTHER

## 2019-06-19 RX ORDER — LAMOTRIGINE 100 MG/1
100 TABLET ORAL 2 TIMES DAILY
Qty: 180 TABLET | Refills: 1 | Status: SHIPPED | OUTPATIENT
Start: 2019-06-19 | End: 2019-08-22

## 2019-06-19 RX ORDER — QUETIAPINE FUMARATE 50 MG/1
50-100 TABLET, FILM COATED ORAL
Qty: 180 TABLET | Refills: 1 | Status: SHIPPED | OUTPATIENT
Start: 2019-06-19 | End: 2020-02-20 | Stop reason: SDUPTHER

## 2019-06-19 RX ORDER — CLONAZEPAM 1 MG/1
TABLET ORAL
Qty: 270 TABLET | Refills: 1 | Status: SHIPPED | OUTPATIENT
Start: 2019-06-19 | End: 2019-11-20 | Stop reason: SDUPTHER

## 2019-06-19 RX ORDER — GABAPENTIN 300 MG/1
CAPSULE ORAL
Qty: 270 CAPSULE | Refills: 1 | Status: SHIPPED | OUTPATIENT
Start: 2019-06-19 | End: 2019-10-04 | Stop reason: SDUPTHER

## 2019-06-27 ENCOUNTER — SOCIAL WORK (OUTPATIENT)
Dept: BEHAVIORAL/MENTAL HEALTH CLINIC | Facility: CLINIC | Age: 59
End: 2019-06-27
Payer: COMMERCIAL

## 2019-06-27 DIAGNOSIS — F41.1 GENERALIZED ANXIETY DISORDER: ICD-10-CM

## 2019-06-27 DIAGNOSIS — F31.32 BIPOLAR AFFECTIVE DISORDER, CURRENTLY DEPRESSED, MODERATE (HCC): Primary | ICD-10-CM

## 2019-06-27 PROCEDURE — 90791 PSYCH DIAGNOSTIC EVALUATION: CPT | Performed by: SOCIAL WORKER

## 2019-08-07 ENCOUNTER — SOCIAL WORK (OUTPATIENT)
Dept: BEHAVIORAL/MENTAL HEALTH CLINIC | Facility: CLINIC | Age: 59
End: 2019-08-07
Payer: COMMERCIAL

## 2019-08-07 DIAGNOSIS — F31.32 BIPOLAR AFFECTIVE DISORDER, CURRENTLY DEPRESSED, MODERATE (HCC): ICD-10-CM

## 2019-08-07 PROCEDURE — 3725F SCREEN DEPRESSION PERFORMED: CPT | Performed by: SOCIAL WORKER

## 2019-08-07 PROCEDURE — 90834 PSYTX W PT 45 MINUTES: CPT | Performed by: SOCIAL WORKER

## 2019-08-07 NOTE — BH TREATMENT PLAN
Corrina Quintana  1960       Date of Initial Treatment Plan: 8/7/19  Date of Current Treatment Plan: 08/07/19    Treatment Plan Number    #1    Strengths/Personal Resources for Self Care: "caring, helpful, reliable;"     Diagnosis:   1  Bipolar affective disorder, currently depressed, moderate ([facility] [address] )         Area of Needs: depression and anxiety    Long Term Goal 1: AI want to be happy  Target Date:  12/18/19  Completion Date: n/a         Short Term Objectives for Goal 1: AI want to identify my values  B  I will learn/review affirmations  C  I will make sure I am taking care of myself  D  I will learn/review effective communication strategies       Target Date: 12/18/19  Completion Date: n/a        GOAL 1: Modality: Individual 2x per month   Completion Date n/a and The person(s) responsible for carrying out the plan is  Henry 1: Diagnosis and Treatment Plan explained to Marcia Ochoa relates understanding diagnosis and is agreeable to Treatment Plan  yes      Client Comments : Please share your thoughts, feelings, need and/or experiences regarding your treatment plan:

## 2019-08-07 NOTE — PSYCH
Psychotherapy Provided: Individual Psychotherapy 45 minutes PHQ 9 = 21;   MARNI 7 = 17; When it was recommended she participated in the [employer] [place] [employer], she responded with , "I l don't like commitment " "I am scared about it (the impending "full knee replacement"-L knee surgery)  " [facility], Dr Owen Francisco, will be performing the surgery; states had had the initial L knee surgery in December , 2016, which she states was not successful and elaborated on details; states while had worked "all of my life" reports had had carpel tunnel surgery on both hands while employed; She noted the surgery was not successful and followed by ulna nerve surgery in R elbow region which she reported was also not successful; states had to leave the workforce due to these latter two medical issues; reports has been out of work for 3 5 years; states her living with her  is "bad; He drinks 24/7 " discussed/reviewed the importance of her advocating for herself per her description of her 's alleged level of functioning; A: presents as anxious and depressed yet trusting her orthopedic surgeon regarding impending L knee surgery; She presents as tolerating her  due to rerorted financial reasons for him to remain in the home  P:(G#1) will devleop treatment plan to include self advocacy, boundaries, self esteem and self-care;    Length of time in session: 45 minutes, follow up in 2 week    Goals addressed in session: Goal 1     Pain:      moderate to severe    7    Current suicide risk : [address]: Diagnosis and Treatment Plan explained to Bao Lopez relates understanding diagnosis and is agreeable to Treatment Plan   Yes

## 2019-08-21 ENCOUNTER — TELEPHONE (OUTPATIENT)
Dept: PSYCHIATRY | Facility: CLINIC | Age: 59
End: 2019-08-21

## 2019-08-22 ENCOUNTER — TELEPHONE (OUTPATIENT)
Dept: PSYCHIATRY | Facility: CLINIC | Age: 59
End: 2019-08-22

## 2019-08-22 DIAGNOSIS — F31.9 BIPOLAR AFFECTIVE DISORDER, CURRENT EPISODE MODERATE (HCC): Primary | ICD-10-CM

## 2019-08-22 DIAGNOSIS — F41.1 GENERALIZED ANXIETY DISORDER: ICD-10-CM

## 2019-08-22 RX ORDER — LAMOTRIGINE 25 MG/1
25 TABLET ORAL DAILY
Qty: 60 TABLET | Refills: 0 | Status: SHIPPED | OUTPATIENT
Start: 2019-08-22 | End: 2019-09-16 | Stop reason: SDUPTHER

## 2019-08-22 RX ORDER — LAMOTRIGINE 100 MG/1
100 TABLET ORAL DAILY
Qty: 180 TABLET | Refills: 1 | Status: SHIPPED | OUTPATIENT
Start: 2019-08-22 | End: 2019-10-04 | Stop reason: SDUPTHER

## 2019-08-22 NOTE — TELEPHONE ENCOUNTER
I called [facility]; apparently they want a clarification if she has been taking 100 mg twice daily  I am not sure why she wouldn't be; I called her cell and left a message to call this office to double check  If she has been taking 100 mg twice daily, there is no need for titration and it can be called in that way  Otherwise we will have to titrate

## 2019-08-22 NOTE — TELEPHONE ENCOUNTER
Celia Medellin would like to talk to you regarding the medication Lamotrigine 100 mg        Contact# [phone]

## 2019-09-13 DIAGNOSIS — F31.9 BIPOLAR AFFECTIVE DISORDER, CURRENT EPISODE MODERATE (HCC): ICD-10-CM

## 2019-09-13 RX ORDER — LAMOTRIGINE 25 MG/1
TABLET ORAL
Qty: 60 TABLET | Refills: 0 | OUTPATIENT
Start: 2019-09-13

## 2019-09-16 DIAGNOSIS — F31.9 BIPOLAR AFFECTIVE DISORDER, CURRENT EPISODE MODERATE (HCC): ICD-10-CM

## 2019-09-16 RX ORDER — LAMOTRIGINE 25 MG/1
TABLET ORAL
Qty: 60 TABLET | Refills: 0 | Status: SHIPPED | OUTPATIENT
Start: 2019-09-16 | End: 2019-11-20

## 2019-10-04 DIAGNOSIS — F31.9 BIPOLAR AFFECTIVE DISORDER, CURRENT EPISODE MODERATE (HCC): ICD-10-CM

## 2019-10-04 DIAGNOSIS — F41.1 GENERALIZED ANXIETY DISORDER: ICD-10-CM

## 2019-10-05 RX ORDER — LAMOTRIGINE 100 MG/1
100 TABLET ORAL DAILY
Qty: 90 TABLET | Refills: 0 | Status: SHIPPED | OUTPATIENT
Start: 2019-10-05 | End: 2019-11-20 | Stop reason: SDUPTHER

## 2019-10-05 RX ORDER — GABAPENTIN 300 MG/1
CAPSULE ORAL
Qty: 270 CAPSULE | Refills: 0 | Status: SHIPPED | OUTPATIENT
Start: 2019-10-05 | End: 2019-11-20 | Stop reason: SDUPTHER

## 2019-10-15 DIAGNOSIS — F31.9 BIPOLAR AFFECTIVE DISORDER, CURRENT EPISODE MODERATE (HCC): ICD-10-CM

## 2019-10-16 RX ORDER — LAMOTRIGINE 25 MG/1
TABLET ORAL
Qty: 60 TABLET | Refills: 0 | OUTPATIENT
Start: 2019-10-16

## 2019-10-24 DIAGNOSIS — F31.9 BIPOLAR AFFECTIVE DISORDER, CURRENT EPISODE MODERATE (HCC): ICD-10-CM

## 2019-10-25 RX ORDER — LAMOTRIGINE 25 MG/1
TABLET ORAL
Qty: 60 TABLET | Refills: 0 | OUTPATIENT
Start: 2019-10-25

## 2019-11-13 ENCOUNTER — TELEPHONE (OUTPATIENT)
Dept: PSYCHIATRY | Facility: CLINIC | Age: 59
End: 2019-11-13

## 2019-11-13 NOTE — TELEPHONE ENCOUNTER
On October 5, 2019 I put prescription through for Lamictal 100 mg once a day to the [facility]  Please notify patient that she should be taking 100 mg per day and prescription should be at the [facility]     Thank you Marybeth Zhao!

## 2019-11-13 NOTE — TELEPHONE ENCOUNTER
Abhishek Bailon called would like to speak with you regarding her medication Lamotrigine  She said she is suppose to be taking 100mg but her last refill was for 50 mg          Upcoming appt 11/20/19      [facility]# [phone]

## 2019-11-20 ENCOUNTER — OFFICE VISIT (OUTPATIENT)
Dept: PSYCHIATRY | Facility: CLINIC | Age: 59
End: 2019-11-20
Payer: COMMERCIAL

## 2019-11-20 VITALS — WEIGHT: 159 LBS | RESPIRATION RATE: 18 BRPM | BODY MASS INDEX: 31.22 KG/M2 | HEIGHT: 60 IN

## 2019-11-20 DIAGNOSIS — E78.2 MIXED HYPERLIPIDEMIA: Primary | ICD-10-CM

## 2019-11-20 DIAGNOSIS — F31.9 BIPOLAR AFFECTIVE DISORDER, CURRENT EPISODE MODERATE (HCC): ICD-10-CM

## 2019-11-20 DIAGNOSIS — F31.32 BIPOLAR AFFECTIVE DISORDER, CURRENTLY DEPRESSED, MODERATE (HCC): ICD-10-CM

## 2019-11-20 DIAGNOSIS — F41.1 GENERALIZED ANXIETY DISORDER: ICD-10-CM

## 2019-11-20 PROCEDURE — 99214 OFFICE O/P EST MOD 30 MIN: CPT | Performed by: PHYSICIAN ASSISTANT

## 2019-11-20 RX ORDER — DULOXETIN HYDROCHLORIDE 60 MG/1
60 CAPSULE, DELAYED RELEASE ORAL DAILY
Qty: 90 CAPSULE | Refills: 1 | Status: SHIPPED | OUTPATIENT
Start: 2019-11-20 | End: 2020-03-25 | Stop reason: SDUPTHER

## 2019-11-20 RX ORDER — OMEGA-3-ACID ETHYL ESTERS 1 G/1
CAPSULE, LIQUID FILLED ORAL
Qty: 360 CAPSULE | Refills: 1 | Status: SHIPPED | OUTPATIENT
Start: 2019-11-20 | End: 2021-06-17 | Stop reason: SDUPTHER

## 2019-11-20 RX ORDER — CLONAZEPAM 1 MG/1
TABLET ORAL
Qty: 270 TABLET | Refills: 1 | Status: SHIPPED | OUTPATIENT
Start: 2019-11-20 | End: 2020-03-25 | Stop reason: SDUPTHER

## 2019-11-20 RX ORDER — CHLORAL HYDRATE 500 MG
1000 CAPSULE ORAL DAILY
COMMUNITY

## 2019-11-20 RX ORDER — LAMOTRIGINE 100 MG/1
100 TABLET ORAL 2 TIMES DAILY
Qty: 180 TABLET | Refills: 1 | Status: SHIPPED | OUTPATIENT
Start: 2019-11-20 | End: 2020-03-25 | Stop reason: SDUPTHER

## 2019-11-20 RX ORDER — GABAPENTIN 300 MG/1
CAPSULE ORAL
Qty: 270 CAPSULE | Refills: 1 | Status: SHIPPED | OUTPATIENT
Start: 2019-11-20 | End: 2020-03-25

## 2019-11-20 NOTE — PSYCH
PROGRESS NOTE        [facility]      Name and Date of Birth:  Makenna Olivares 60 y o  1960    Date of Visit: 11/20/19    SUBJECTIVE:  Patient seen for follow-up of generalized anxiety and bipolar disorder  Patient states she has been frustrated with inability to get through to her office via the phone system  States that she had left a message for me several times in August and September which I did not receive  She had reconstructive knee replacement surgery August 26 and overall is slowly recuperating  Continues with some difficulties with pain but overall is gradually improving  States that her orthopedic doctor she saw for this she feels is very helpful  Overall anxiety and moods have been stable since being on Lamictal 200 mg total per day  Tolerating medications well without any adverse effect  Overall receiving adequate sleep and eating well  States that she also recently started seasonal job at [employer] and had her 1st day which went well  She does have some increased knee pain at times though  She denies suicidal ideation, intent or plan at present, has no suicidal ideation, intent or plan at present  She denies any auditory hallucinations and visual hallucinations, denies any other delusional thinking, denies any delusional thinking  She denies any side effects from medications    HPI ROS Appetite Changes and Sleep: normal appetite, normal sleep    Review Of Systems:      Constitutional Negative   ENT Negative   Cardiovascular Negative   Respiratory Negative   Gastrointestinal Negative   Genitourinary Negative   Musculoskeletal Knee pain   Integumentary Negative   Neurological Negative   Endocrine Negative   Other Symptoms Negative and None       Laboratory Results: No results found for this or any previous visit      Substance Abuse History:    Social History     Substance and Sexual Activity   Drug Use No       Family Psychiatric History:     No family history on file      The following portions of the patient's history were reviewed and updated as appropriate: past family history, past medical history, past social history, past surgical history and problem list     Social History     Socioeconomic History    Marital status: Single     Spouse name: Not on file    Number of children: Not on file    Years of education: Not on file    Highest education level: Not on file   Occupational History    Not on file   Social Needs    Financial resource strain: Not on file    Food insecurity:     Worry: Not on file     Inability: Not on file    Transportation needs:     Medical: Not on file     Non-medical: Not on file   Tobacco Use    Smoking status: Current Every Day Smoker     Packs/day: 1 50    Smokeless tobacco: Never Used   Substance and Sexual Activity    Alcohol use: No    Drug use: No    Sexual activity: Not on file   Lifestyle    Physical activity:     Days per week: Not on file     Minutes per session: Not on file    Stress: Not on file   Relationships    Social connections:     Talks on phone: Not on file     Gets together: Not on file     Attends Bahai service: Not on file     Active member of club or organization: Not on file     Attends meetings of clubs or organizations: Not on file     Relationship status: Not on file    Intimate partner violence:     Fear of current or ex partner: Not on file     Emotionally abused: Not on file     Physically abused: Not on file     Forced sexual activity: Not on file   Other Topics Concern    Not on file   Social History Narrative    Not on file     Social History     Social History Narrative    Not on file        Social History     Tobacco History     Smoking Status  Current Every Day Smoker Smoking Frequency  1 5 packs/day    Smokeless Tobacco Use  Never Used          Alcohol History     Alcohol Use Status  No          Drug Use     Drug Use Status  No          Sexual Activity Sexually Active  Not Asked          Activities of Daily Living    Not Asked                     OBJECTIVE:     Mental Status Evaluation:    Appearance age appropriate, casually dressed   Behavior pleasant, cooperative   Speech normal volume, normal pitch   Mood Euthymic   Affect Congruent   Thought Processes logical   Associations intact associations   Thought Content normal   Perceptual Disturbances: none   Abnormal Thoughts  Risk Potential Suicidal ideation - None  Homicidal ideation - None  Potential for aggression - No   Orientation oriented to person, place, time/date and situation   Memory recent and remote memory grossly intact   Cosciousness alert and awake   Attention Span attention span and concentration are age appropriate   Intellect Appears to be of Average Intelligence   Insight age appropriate    Judgement good    Muscle Strength and  Gait muscle strength and tone were normal   Language no difficulty naming common objects   Fund of Knowledge displays adequate knowledge of current events   Pain none   Pain Scale 0       Assessment/Plan:       Diagnoses and all orders for this visit:    Mixed hyperlipidemia  -     omega-3-acid ethyl esters (LOVAZA) 1 g capsule; 2 po bid    Bipolar affective disorder, current episode moderate (HCC)  -     lamoTRIgine (LaMICtal) 100 mg tablet; Take 1 tablet (100 mg total) by mouth 2 (two) times a day  -     DULoxetine (CYMBALTA) 60 mg delayed release capsule; Take 1 capsule (60 mg total) by mouth daily  -     gabapentin (NEURONTIN) 300 mg capsule; 1 po tid    Generalized anxiety disorder  -     lamoTRIgine (LaMICtal) 100 mg tablet; Take 1 tablet (100 mg total) by mouth 2 (two) times a day  -     DULoxetine (CYMBALTA) 60 mg delayed release capsule;  Take 1 capsule (60 mg total) by mouth daily  -     gabapentin (NEURONTIN) 300 mg capsule; 1 po tid  -     clonazePAM (KlonoPIN) 1 mg tablet; 1/2 po bid and 2 po qhs prn    Bipolar affective disorder, currently depressed, moderate (HCC)  -     clonazePAM (KlonoPIN) 1 mg tablet; 1/2 po bid and 2 po qhs prn    Other orders  -     Omega-3 Fatty Acids (FISH OIL) 1,000 mg; Take 1,000 mg by mouth daily          Treatment Recommendations/Precautions:  Lamictal 100 mg b i d  Seroquel  mg at bedtime  Neurontin 300 mg t i d   Klonopin 1 mg half to one t i d  P r n  Follow-up four months  Continue current medications:  5f 159    Risks/Benefits      Risks, Benefits And Possible Side Effects Of Medications:    Risks, benefits, and possible side effects of medications explained to patient and patient verbalizes understanding and agreement for treatment      Controlled Medication Discussion:     Taking as prescribed    Psychotherapy Provided:     Individual psychotherapy provided: No

## 2020-02-20 DIAGNOSIS — F31.9 BIPOLAR AFFECTIVE DISORDER, CURRENT EPISODE MODERATE (HCC): ICD-10-CM

## 2020-02-20 DIAGNOSIS — F41.1 GENERALIZED ANXIETY DISORDER: ICD-10-CM

## 2020-02-20 RX ORDER — QUETIAPINE FUMARATE 50 MG/1
50-100 TABLET, FILM COATED ORAL
Qty: 180 TABLET | Refills: 1 | Status: SHIPPED | OUTPATIENT
Start: 2020-02-20 | End: 2020-08-11

## 2020-03-25 ENCOUNTER — TELEMEDICINE (OUTPATIENT)
Dept: PSYCHIATRY | Facility: CLINIC | Age: 60
End: 2020-03-25
Payer: COMMERCIAL

## 2020-03-25 DIAGNOSIS — F41.1 GENERALIZED ANXIETY DISORDER: ICD-10-CM

## 2020-03-25 DIAGNOSIS — F31.9 BIPOLAR AFFECTIVE DISORDER, CURRENT EPISODE MODERATE (HCC): ICD-10-CM

## 2020-03-25 DIAGNOSIS — F31.32 BIPOLAR AFFECTIVE DISORDER, CURRENTLY DEPRESSED, MODERATE (HCC): Primary | ICD-10-CM

## 2020-03-25 PROCEDURE — G2012 BRIEF CHECK IN BY MD/QHP: HCPCS | Performed by: PHYSICIAN ASSISTANT

## 2020-03-25 RX ORDER — LAMOTRIGINE 200 MG/1
200 TABLET ORAL DAILY
Qty: 90 TABLET | Refills: 1 | Status: SHIPPED | OUTPATIENT
Start: 2020-03-25 | End: 2020-07-22 | Stop reason: SDUPTHER

## 2020-03-25 RX ORDER — DULOXETIN HYDROCHLORIDE 30 MG/1
30 CAPSULE, DELAYED RELEASE ORAL DAILY
Qty: 90 CAPSULE | Refills: 1 | Status: SHIPPED | OUTPATIENT
Start: 2020-03-25 | End: 2020-07-22 | Stop reason: SDUPTHER

## 2020-03-25 RX ORDER — CLONAZEPAM 1 MG/1
TABLET ORAL
Qty: 270 TABLET | Refills: 1 | Status: SHIPPED | OUTPATIENT
Start: 2020-03-25 | End: 2020-07-22 | Stop reason: SDUPTHER

## 2020-03-25 NOTE — PSYCH
Virtual Regular Visit    Problem List Items Addressed This Visit        Other    Bipolar affective disorder, current episode moderate ([address] ) - Primary    Generalized anxiety disorder          Recent Visits  No visits were found meeting these conditions  Showing recent visits within past 7 days and meeting all other requirements     Today's Visits  Date Type Provider Dept   03/25/20 Telemedicine Broderick Anton PA-C [facility] today's visits and meeting all other requirements     Future Appointments  No visits were found meeting these conditions  Showing future appointments within next 150 days and meeting all other requirements         Reason for visit is follow-up of bipolar disorder, anxiety and medication check    Encounter provider Broderick Anton PA-C    Provider located at [address] [place] [address]          After connecting through [employer], the patient was identified by name and date of birth  Tarsha Oreilly was informed that this is a telemedicine visit and that the visit is being conducted through telephone which may not be secure and therefore, might not be HIPAA-compliant  My office door was closed  No one else was in the room  She acknowledged consent and understanding of privacy and security of the video platform  The patient has agreed to participate and understands they can discontinue the visit at any time  Subjective  Tarsha Oreilly is a 60 y o  female with history of bipolar disorder and anxiety  Patient states that she has been having increased anxiety recently due to the corona virus  She worries about her son who lives by himself  States that her spouse also watches the news often which has been more stressful for her  She has variable sleep  She has been eating adequately  Continues with chronic knee pain  States initially was improved in November but has been worse than ever    Also states that she has been having adverse effects with some of her medications since all of pharmacist about this  States that she has been having increased GI distress and headache after she takes her Cymbalta in the morning  Patient has been on this medication for several years and has not voiced that complaints to me until today  Also states the pharmacist question the gabapentin  She does not feel so this is helping with her anxiety or her pain  We discussed this and anger the patient if it is not helping will taper her off  Explained to the patient with starting the gabapentin I was hoping to reduce and discontinue the need for her Klonopin  Patient also states that sometimes she is forgetting to take her 2nd dose of the Lamictal       Past Medical History:   Diagnosis Date    Generalized anxiety disorder 10/29/2014       No past surgical history on file  Current Outpatient Medications   Medication Sig Dispense Refill    ascorbic acid (VITAMIN C) 500 mg tablet TAKE 1 TABLET BY MOUTH EVERY DAY FOR 50 DAYS  0    atorvastatin (LIPITOR) 20 mg tablet Take 20 mg by mouth      celecoxib (CeleBREX) 200 mg capsule TAKE ONE CAPSULE BY MOUTH TWICE A DAY      Cholecalciferol (VITAMIN D3) 1000 units CAPS TAKE 1 CAPSULE (50,000 UNITS TOTAL) BY MOUTH ONCE A WEEK FOR 12 DOSES   clonazePAM (KlonoPIN) 1 mg tablet 1/2 po bid and 2 po qhs prn 270 tablet 1    D-3-5 5000 units capsule TAKE 1 CAPSULE (5,000 UNITS TOTAL) BY MOUTH DAILY    3    DULoxetine (CYMBALTA) 60 mg delayed release capsule Take 1 capsule (60 mg total) by mouth daily 90 capsule 1    fenofibrate (FENOGLIDE) 120 MG TABS Take 120 mg by mouth      fluticasone furoate-vilanterol (BREO ELLIPTA) Inhale 1 puff      gabapentin (NEURONTIN) 300 mg capsule 1 po tid 270 capsule 1    lamoTRIgine (LaMICtal) 100 mg tablet Take 1 tablet (100 mg total) by mouth 2 (two) times a day 180 tablet 1    LINZESS 145 MCG CAPS 1 (ONE) CAPSULE 30 MIN PRIOR TO MEAL  1    metoprolol succinate (TOPROL-XL) 25 mg 24 hr tablet Take 25 mg by mouth      Multiple Vitamin (DAILY VALUE MULTIVITAMIN) TABS Take 1 tablet by mouth      Omega-3 Fatty Acids (FISH OIL) 1,000 mg Take 1,000 mg by mouth daily      omega-3-acid ethyl esters (LOVAZA) 1 g capsule 2 po bid 360 capsule 1    pantoprazole (PROTONIX) 40 mg tablet Take by mouth      PROAIR RESPICLICK 488 (90 Base) MCG/ACT AEPB Inhale 2 puffs every 4 (four) hours as needed  3    QUEtiapine (SEROquel) 50 mg tablet Take 1-2 tablets ( mg total) by mouth daily at bedtime 180 tablet 1    traMADol (ULTRAM) 50 mg tablet Take 50 mg by mouth every 6 (six) hours as needed  0     No current facility-administered medications for this visit  Allergies   Allergen Reactions    Latex Rash       Review of Systems   Patient complains of headache and the stomach in the mornings  Chronic knee pain  Patient continues with residual anxiety and panic feelings  Dysthymic at times but no suicidal or homicidal ideation  No psychotic signs or symptoms  Linear and coherent in conversation  Continue with medications as follows  Lamictal 100 mg b i d  Change to 200 mg once a day to enhance compliance   Seroquel 50 mg at bedtime  Gabapentin 300 mg t i d  Decreased to twice a day for three days then once a day for three days then DC due to patient's concerns of side effects  Klonopin 1 mg half to one t i d  P r n  Decrease Cymbalta from 60 mg to 30 mg daily due to patient's complain of GI distress and headache    I spent 25 minutes with the patient during this visit

## 2020-07-22 ENCOUNTER — TELEMEDICINE (OUTPATIENT)
Dept: PSYCHIATRY | Facility: CLINIC | Age: 60
End: 2020-07-22
Payer: COMMERCIAL

## 2020-07-22 DIAGNOSIS — F31.32 BIPOLAR AFFECTIVE DISORDER, CURRENTLY DEPRESSED, MODERATE (HCC): Primary | ICD-10-CM

## 2020-07-22 DIAGNOSIS — F41.1 GENERALIZED ANXIETY DISORDER: ICD-10-CM

## 2020-07-22 PROCEDURE — 99443 PR PHYS/QHP TELEPHONE EVALUATION 21-30 MIN: CPT | Performed by: PHYSICIAN ASSISTANT

## 2020-07-22 RX ORDER — LAMOTRIGINE 200 MG/1
200 TABLET ORAL DAILY
Qty: 90 TABLET | Refills: 1 | Status: SHIPPED | OUTPATIENT
Start: 2020-07-22 | End: 2020-09-17 | Stop reason: SDUPTHER

## 2020-07-22 RX ORDER — CLONAZEPAM 1 MG/1
TABLET ORAL
Qty: 270 TABLET | Refills: 1 | Status: SHIPPED | OUTPATIENT
Start: 2020-07-22 | End: 2021-02-24 | Stop reason: SDUPTHER

## 2020-07-22 RX ORDER — METOPROLOL TARTRATE 50 MG/1
25 TABLET, FILM COATED ORAL 2 TIMES DAILY
COMMUNITY
Start: 2020-07-14 | End: 2021-07-14

## 2020-07-22 RX ORDER — DULOXETIN HYDROCHLORIDE 60 MG/1
60 CAPSULE, DELAYED RELEASE ORAL DAILY
Qty: 90 CAPSULE | Refills: 1 | Status: SHIPPED | OUTPATIENT
Start: 2020-07-22 | End: 2020-09-17 | Stop reason: SDUPTHER

## 2020-07-22 RX ORDER — CLOPIDOGREL BISULFATE 75 MG/1
TABLET ORAL
COMMUNITY
Start: 2020-06-23

## 2020-07-22 NOTE — PSYCH
Virtual Brief Visit    Assessment/Plan:    Problem List Items Addressed This Visit        Other    Bipolar affective disorder, current episode moderate ([address] ) - Primary    Relevant Medications    DULoxetine (CYMBALTA) 60 mg delayed release capsule    clonazePAM (KlonoPIN) 1 mg tablet    Generalized anxiety disorder    Relevant Medications    DULoxetine (CYMBALTA) 60 mg delayed release capsule    lamoTRIgine (LaMICtal) 200 MG tablet    clonazePAM (KlonoPIN) 1 mg tablet                Reason for visit is   Chief Complaint   Patient presents with    Follow-up    Medication Management    Virtual Brief Visit        Encounter provider Naheed Barajas PA-C    Provider located at [facility] [id number]    Recent Visits  No visits were found meeting these conditions  Showing recent visits within past 7 days and meeting all other requirements     Today's Visits  Date Type Provider Dept   07/22/20 Telemedicine [facility] Wiggins 71 today's visits and meeting all other requirements     Future Appointments  No visits were found meeting these conditions  Showing future appointments within next 150 days and meeting all other requirements        After connecting through telephone, the patient was identified by name and date of birth  Danielito Mack was informed that this is a telemedicine visit and that the visit is being conducted through telephone  My office door was closed  No one else was in the room  She acknowledged consent and understanding of privacy and security of the platform  The patient has agreed to participate and understands she can discontinue the visit at any time  Patient is aware this is a billable service  Subjective    Danielito Mack is a 63 y o  female with bipolar disorder  HPI   Pt states she is "worse than ever"    Had MI in June and was hospitalized for this   Her spouse is currently in the hospital for PE but may be getting out today  She states she also continues with knee pain which has been ongoing  Continues with complaints of anxiety largely due to pain  She is going to physical therapy now for cardiac rehab  Reports that she has a limited support system  Due to COVID restriction she has not been able to get out  She had been doing several months ago because she had been going to [employer] activities and going to watch with some friends which she has now not been able to do  Patient is having increased depression and easily tearful  Increased anxiety  Medication list was reviewed and updated  Past Medical History:   Diagnosis Date    Generalized anxiety disorder 10/29/2014       No past surgical history on file  Current Outpatient Medications   Medication Sig Dispense Refill    ascorbic acid (VITAMIN C) 500 mg tablet TAKE 1 TABLET BY MOUTH EVERY DAY FOR 50 DAYS  0    atorvastatin (LIPITOR) 20 mg tablet Take 20 mg by mouth      celecoxib (CeleBREX) 200 mg capsule TAKE ONE CAPSULE BY MOUTH TWICE A DAY      Cholecalciferol (VITAMIN D3) 1000 units CAPS TAKE 1 CAPSULE (50,000 UNITS TOTAL) BY MOUTH ONCE A WEEK FOR 12 DOSES   clonazePAM (KlonoPIN) 1 mg tablet 1/2 po bid and 2 po qhs prn 270 tablet 1    clopidogrel (PLAVIX) 75 mg tablet Take 4 tablets (300 mg) on day one then take 1 tablet daily after that  Start after brilinta gone   D-3-5 5000 units capsule TAKE 1 CAPSULE (5,000 UNITS TOTAL) BY MOUTH DAILY    3    DULoxetine (CYMBALTA) 60 mg delayed release capsule Take 1 capsule (60 mg total) by mouth daily 90 capsule 1    fenofibrate (FENOGLIDE) 120 MG TABS Take 120 mg by mouth      fluticasone furoate-vilanterol (BREO ELLIPTA) Inhale 1 puff      lamoTRIgine (LaMICtal) 200 MG tablet Take 1 tablet (200 mg total) by mouth daily 90 tablet 1    LINZESS 145 MCG CAPS 1 (ONE) CAPSULE 30 MIN PRIOR TO MEAL  1    metoprolol succinate (TOPROL-XL) 25 mg 24 hr tablet Take 25 mg by mouth      metoprolol tartrate (LOPRESSOR) 50 mg tablet Take 25 mg by mouth 2 (two) times a day      Multiple Vitamin (DAILY VALUE MULTIVITAMIN) TABS Take 1 tablet by mouth      Omega-3 Fatty Acids (FISH OIL) 1,000 mg Take 1,000 mg by mouth daily      omega-3-acid ethyl esters (LOVAZA) 1 g capsule 2 po bid 360 capsule 1    pantoprazole (PROTONIX) 40 mg tablet Take by mouth      PROAIR RESPICLICK 360 (90 Base) MCG/ACT AEPB Inhale 2 puffs every 4 (four) hours as needed  3    QUEtiapine (SEROquel) 50 mg tablet Take 1-2 tablets ( mg total) by mouth daily at bedtime 180 tablet 1    traMADol (ULTRAM) 50 mg tablet Take 50 mg by mouth every 6 (six) hours as needed  0     No current facility-administered medications for this visit  Allergies   Allergen Reactions    Latex Rash       Review of Systems   Ongoing knee pain  Sleep variable, fair appetite    MSE:  Speech rambling  Somatic preoccupations  Mood dysthymic, anxious  No suicidal homicidal ideation  No psychotic signs symptoms, no delusions or hallucinations  Cognition fair  Insight and judgement is fair       Medications as follows: Will increase Cymbalta back to 60 mg daily, this was decreased in March due to patient's meeting with the pharmacist and concerns of possible GI distress and headache with his medication however patient does not feel it was due the Cymbalta and her mood has declined  Continue lamotrigine 200 mg daily  Klonopin 1 mg half tab b i d  P r n  And two at bedtime as needed  Seroquel 50 mg 1-2 at nighttime, patient typically taking one  Will follow-up in two months and she will call sooner if any questions or concerns    There were no vitals filed for this visit  I spent 35 minutes directly with the patient during this visit    VIRTUAL VISIT One Vinny Calderon Pino acknowledges that she has consented to an online visit or consultation   She understands that the online visit is based solely on information provided by her, and that, in the absence of a face-to-face physical evaluation by the physician, the diagnosis she receives is both limited and provisional in terms of accuracy and completeness  This is not intended to replace a full medical face-to-face evaluation by the physician  Jamarcus Leon understands and accepts these terms

## 2020-07-22 NOTE — BH TREATMENT PLAN
TREATMENT PLAN (Medication Management Only)        [facility]    Name and Date of Birth:  Lavonne Mir 63 y o  1960  Date of Treatment Plan: July 22, 2020  Diagnosis/Diagnoses:    1  Bipolar affective disorder, currently depressed, moderate ([address] )    2  Generalized anxiety disorder      Strengths/Personal Resources for Self-Care: taking medications as prescribed  Area/Areas of need (in own words): anxiety symptoms, depressive symptoms  1  Long Term Goal: continue improvement in depression  Target Date: 2 months - 9/22/2020  Person/Persons responsible for completion of goal: Freya  2  Short Term Objective (s) - How will we reach this goal?:   A  Provider new recommended medication/dosage changes and/or continue medication(s): Increase Cymbalta to 60 mg   B  N/A   C  N/A  Target Date: 2 months - 9/22/2020  Person/Persons Responsible for Completion of Goal: Freya  Progress Towards Goals: continuing treatment  Treatment Modality: medication management every 4 months  Review due 90 to 120 days from date of this plan: Six months  Expected length of service: ongoing treatment  My Physician/PA/NP and I have developed this plan together and I agree to work on the goals and objectives  I understand the treatment goals that were developed for my treatment    Patient appointment done over the telephone due to Natali so unable to sign treatment

## 2020-08-08 DIAGNOSIS — F41.1 GENERALIZED ANXIETY DISORDER: ICD-10-CM

## 2020-08-08 DIAGNOSIS — F31.9 BIPOLAR AFFECTIVE DISORDER, CURRENT EPISODE MODERATE (HCC): ICD-10-CM

## 2020-08-11 RX ORDER — QUETIAPINE FUMARATE 50 MG/1
TABLET, FILM COATED ORAL
Qty: 180 TABLET | Refills: 1 | Status: SHIPPED | OUTPATIENT
Start: 2020-08-11 | End: 2021-06-17 | Stop reason: SDUPTHER

## 2020-09-17 ENCOUNTER — OFFICE VISIT (OUTPATIENT)
Dept: PSYCHIATRY | Facility: CLINIC | Age: 60
End: 2020-09-17
Payer: COMMERCIAL

## 2020-09-17 DIAGNOSIS — F17.210 CIGARETTE NICOTINE DEPENDENCE WITHOUT COMPLICATION: ICD-10-CM

## 2020-09-17 DIAGNOSIS — F31.32 BIPOLAR AFFECTIVE DISORDER, CURRENTLY DEPRESSED, MODERATE (HCC): Primary | ICD-10-CM

## 2020-09-17 DIAGNOSIS — F41.1 GENERALIZED ANXIETY DISORDER: ICD-10-CM

## 2020-09-17 PROCEDURE — 99214 OFFICE O/P EST MOD 30 MIN: CPT | Performed by: PHYSICIAN ASSISTANT

## 2020-09-17 RX ORDER — DULOXETIN HYDROCHLORIDE 30 MG/1
30 CAPSULE, DELAYED RELEASE ORAL DAILY
Qty: 30 CAPSULE | Refills: 2 | Status: SHIPPED | OUTPATIENT
Start: 2020-09-17 | End: 2020-10-23

## 2020-09-17 RX ORDER — LAMOTRIGINE 200 MG/1
200 TABLET ORAL
Qty: 90 TABLET | Refills: 1 | Status: SHIPPED | OUTPATIENT
Start: 2020-09-17 | End: 2021-03-04 | Stop reason: SDUPTHER

## 2020-09-17 RX ORDER — BUPROPION HYDROCHLORIDE 150 MG/1
150 TABLET ORAL EVERY MORNING
Qty: 90 TABLET | Refills: 1 | Status: SHIPPED | OUTPATIENT
Start: 2020-09-17 | End: 2020-11-25 | Stop reason: SDUPTHER

## 2020-09-17 NOTE — PSYCH
PROGRESS NOTE        [facility]      Name and Date of Birth:  Pilo Anderson 63 y o  1960    Date of Visit: 09/17/20    SUBJECTIVE:  Patient seen for follow-up of bipolar depression and generalized anxiety  Patient reports that she continues with depression  Also residual anxiety at times  States that she has actually increased her cigarette smoking since her heart attack to a pack and half a day  This has been increasingly frustrating for her  Cymbalta was increased back to 60 mg at her last appointment but she has not noted any improvement with that  She has been on Cymbalta for the past three years and has not noted much of a benefit  States that she had tried the mom and the patches with no success  She spoke to a quitting program yesterday and they felt as though she had increased depression  Patient states that she does not want to quit  She has been going to the gym on a regular basis though in taking care of herself by exercising  Sleep is fair with her medications  Appetite is adequate and she is trying to watch her diet and weight due to her knee surgery  Continues with stressors with her spouse and reports that they argue frequently  She is worried about his health though as he has been having issues with his mouth  He also has a history of throat cancer and will be seen by maxillofacial  specialist in [place] for his teeth and gum issues the next few days  She denies suicidal ideation, intent or plan at present, has no suicidal ideation, intent or plan at present  She denies any auditory hallucinations and visual hallucinations, denies any other delusional thinking, denies any delusional thinking  Side effect of diaphoresis likely due to Cymbalta      HPI ROS Appetite Changes and Sleep: normal appetite, fair sleep    Review Of Systems:      Constitutional Negative   ENT Negative   Cardiovascular Negative   Respiratory Negative   Gastrointestinal Negative   Genitourinary Negative   Musculoskeletal Negative   Integumentary Negative   Neurological Negative   Endocrine Negative   Other Symptoms Negative and None       Laboratory Results: No results found for this or any previous visit  Substance Abuse History:    Social History     Substance and Sexual Activity   Drug Use No       Family Psychiatric History:     No family history on file      The following portions of the patient's history were reviewed and updated as appropriate: past family history, past medical history, past social history, past surgical history and problem list     Social History     Socioeconomic History    Marital status: Single     Spouse name: Not on file    Number of children: Not on file    Years of education: Not on file    Highest education level: Not on file   Occupational History    Not on file   Social Needs    Financial resource strain: Not on file    Food insecurity     Worry: Not on file     Inability: Not on file    Transportation needs     Medical: Not on file     Non-medical: Not on file   Tobacco Use    Smoking status: Current Every Day Smoker     Packs/day: 1 50    Smokeless tobacco: Never Used   Substance and Sexual Activity    Alcohol use: No    Drug use: No    Sexual activity: Not on file   Lifestyle    Physical activity     Days per week: Not on file     Minutes per session: Not on file    Stress: Not on file   Relationships    Social connections     Talks on phone: Not on file     Gets together: Not on file     Attends [employer] service: Not on file     Active member of club or organization: Not on file     Attends meetings of clubs or organizations: Not on file     Relationship status: Not on file    Intimate partner violence     Fear of current or ex partner: Not on file     Emotionally abused: Not on file     Physically abused: Not on file     Forced sexual activity: Not on file   Other Topics Concern    Not on file Social History Narrative    Not on file     Social History     Social History Narrative    Not on file        Social History     Tobacco History     Smoking Status  Current Every Day Smoker Smoking Frequency  1 5 packs/day    Smokeless Tobacco Use  Never Used          Alcohol History     Alcohol Use Status  No          Drug Use     Drug Use Status  No          Sexual Activity     Sexually Active  Not Asked          Activities of Daily Living    Not Asked                     OBJECTIVE:     Mental Status Evaluation:    Appearance age appropriate, casually dressed   Behavior pleasant, cooperative   Speech normal volume, normal pitch   Mood Depressed   Affect Depressed   Thought Processes logical   Associations intact associations   Thought Content normal   Perceptual Disturbances: none   Abnormal Thoughts  Risk Potential Suicidal ideation - None  Homicidal ideation - None  Potential for aggression - No   Orientation oriented to person, place, time/date and situation   Memory recent and remote memory grossly intact   Cosciousness alert and awake   Attention Span attention span and concentration are age appropriate   Intellect Appears to be of Average Intelligence   Insight age appropriate    Judgement good    Muscle Strength and  Gait muscle strength and tone were normal   Language no difficulty naming common objects   Fund of Knowledge displays adequate knowledge of current events   Pain none   Pain Scale 0       Assessment/Plan:       Diagnoses and all orders for this visit:    Bipolar affective disorder, currently depressed, moderate (HCC)  -     buPROPion (WELLBUTRIN XL) 150 mg 24 hr tablet; Take 1 tablet (150 mg total) by mouth every morning    Generalized anxiety disorder  -     DULoxetine (CYMBALTA) 30 mg delayed release capsule; Take 1 capsule (30 mg total) by mouth daily  -     lamoTRIgine (LaMICtal) 200 MG tablet;  Take 1 tablet (200 mg total) by mouth daily at bedtime    Cigarette nicotine dependence without complication          Treatment Recommendations/Precautions:  Lamictal 200 mg daily  Cymbalta 60 mg daily decreased to 30 mg daily with plan to taper off due to inefficacy  Will start Wellbutrin  mg q a m  For depression as well  for smoking cessation  No history of seizure disorder or traumatic brain injury  Klonopin 1 mg half tab b i d  P r n  And two at bedtime as needed  Seroquel 50 mg 1-2 at night typically taking one at night  Will follow-up in one month and she will call sooner if any questions or concerns        Risks/Benefits      Risks, Benefits And Possible Side Effects Of Medications:    Risks, benefits, and possible side effects of medications explained to patient and patient verbalizes understanding and agreement for treatment      Controlled Medication Discussion:     Taking as prescribed    Psychotherapy Provided:     Individual psychotherapy provided: No

## 2020-10-23 ENCOUNTER — OFFICE VISIT (OUTPATIENT)
Dept: PSYCHIATRY | Facility: CLINIC | Age: 60
End: 2020-10-23
Payer: COMMERCIAL

## 2020-10-23 VITALS — RESPIRATION RATE: 20 BRPM | BODY MASS INDEX: 28.32 KG/M2 | HEIGHT: 61 IN | WEIGHT: 150 LBS

## 2020-10-23 DIAGNOSIS — F17.210 CIGARETTE NICOTINE DEPENDENCE WITHOUT COMPLICATION: ICD-10-CM

## 2020-10-23 DIAGNOSIS — F31.32 BIPOLAR AFFECTIVE DISORDER, CURRENTLY DEPRESSED, MODERATE (HCC): Primary | ICD-10-CM

## 2020-10-23 DIAGNOSIS — F41.1 GENERALIZED ANXIETY DISORDER: ICD-10-CM

## 2020-10-23 PROCEDURE — 99214 OFFICE O/P EST MOD 30 MIN: CPT | Performed by: PHYSICIAN ASSISTANT

## 2020-11-25 ENCOUNTER — OFFICE VISIT (OUTPATIENT)
Dept: PSYCHIATRY | Facility: CLINIC | Age: 60
End: 2020-11-25
Payer: COMMERCIAL

## 2020-11-25 VITALS — BODY MASS INDEX: 27.56 KG/M2 | RESPIRATION RATE: 20 BRPM | WEIGHT: 146 LBS | HEIGHT: 61 IN

## 2020-11-25 DIAGNOSIS — F41.1 GENERALIZED ANXIETY DISORDER: ICD-10-CM

## 2020-11-25 DIAGNOSIS — F17.210 CIGARETTE NICOTINE DEPENDENCE WITHOUT COMPLICATION: ICD-10-CM

## 2020-11-25 DIAGNOSIS — F31.32 BIPOLAR AFFECTIVE DISORDER, CURRENTLY DEPRESSED, MODERATE (HCC): Primary | ICD-10-CM

## 2020-11-25 PROCEDURE — 99214 OFFICE O/P EST MOD 30 MIN: CPT | Performed by: PHYSICIAN ASSISTANT

## 2020-11-25 RX ORDER — BUPROPION HYDROCHLORIDE 300 MG/1
300 TABLET ORAL EVERY MORNING
Qty: 90 TABLET | Refills: 1 | Status: SHIPPED | OUTPATIENT
Start: 2020-11-25 | End: 2021-03-04 | Stop reason: SDUPTHER

## 2020-12-01 ENCOUNTER — TELEPHONE (OUTPATIENT)
Dept: PSYCHIATRY | Facility: CLINIC | Age: 60
End: 2020-12-01

## 2020-12-01 DIAGNOSIS — F31.32 BIPOLAR AFFECTIVE DISORDER, CURRENTLY DEPRESSED, MODERATE (HCC): ICD-10-CM

## 2020-12-02 DIAGNOSIS — F31.32 BIPOLAR AFFECTIVE DISORDER, CURRENTLY DEPRESSED, MODERATE (HCC): ICD-10-CM

## 2021-02-24 DIAGNOSIS — F31.32 BIPOLAR AFFECTIVE DISORDER, CURRENTLY DEPRESSED, MODERATE (HCC): ICD-10-CM

## 2021-02-24 DIAGNOSIS — F41.1 GENERALIZED ANXIETY DISORDER: ICD-10-CM

## 2021-02-24 RX ORDER — CLONAZEPAM 1 MG/1
TABLET ORAL
Qty: 270 TABLET | Refills: 0 | Status: SHIPPED | OUTPATIENT
Start: 2021-02-24 | End: 2021-05-25

## 2021-03-04 ENCOUNTER — OFFICE VISIT (OUTPATIENT)
Dept: PSYCHIATRY | Facility: CLINIC | Age: 61
End: 2021-03-04
Payer: COMMERCIAL

## 2021-03-04 DIAGNOSIS — F41.1 GENERALIZED ANXIETY DISORDER: ICD-10-CM

## 2021-03-04 DIAGNOSIS — F31.32 BIPOLAR AFFECTIVE DISORDER, CURRENTLY DEPRESSED, MODERATE (HCC): Primary | ICD-10-CM

## 2021-03-04 DIAGNOSIS — F17.210 CIGARETTE NICOTINE DEPENDENCE WITHOUT COMPLICATION: ICD-10-CM

## 2021-03-04 PROCEDURE — 90833 PSYTX W PT W E/M 30 MIN: CPT | Performed by: PHYSICIAN ASSISTANT

## 2021-03-04 PROCEDURE — 99213 OFFICE O/P EST LOW 20 MIN: CPT | Performed by: PHYSICIAN ASSISTANT

## 2021-03-04 RX ORDER — BUPROPION HYDROCHLORIDE 150 MG/1
150 TABLET ORAL EVERY MORNING
Qty: 90 TABLET | Refills: 1 | Status: SHIPPED | OUTPATIENT
Start: 2021-03-04 | End: 2021-06-17 | Stop reason: SDUPTHER

## 2021-03-04 RX ORDER — LAMOTRIGINE 200 MG/1
200 TABLET ORAL
Qty: 90 TABLET | Refills: 1 | Status: SHIPPED | OUTPATIENT
Start: 2021-03-04 | End: 2021-06-17 | Stop reason: SDUPTHER

## 2021-03-04 NOTE — PSYCH
PROGRESS NOTE        [facility]      Name and Date of Birth:  Zulema Modi 63 y o  1960    Date of Visit: 03/04/21    SUBJECTIVE:   Guerline Gonzalez was seen for follow-up bipolar disorder, anxiety and medication check  Unfortunately she continues with multiple stressors and continues with high anxiety and dysthymia  Her spouse had  significant oral surgery  In [place] a couple months ago and is recuperating from that  He now has a G-tube and she is administering his feedings  This has been stressful and difficult for her  Reports that she has not been eating as well due to not wanting to eat in front of him because she feels bad  She has been havign increased physical issues the past few months  She has continued with chronic knee pain since her surgery a couple years ago  Also reports that she has been having low back/sciatic pain  She was seen at an [facility]  And is planning to follow with physiatry  Her pain has been  Quite disruptive and preventing her from doing things  also reports that her son will be having a mammogram due to possible breast tumor  Understandably she has increased stressors and concerns about that  Overall she has been having a significant increase in environmental stressors and difficulty with coping and self-care  We discussed trying to prioritize things and take care of herself  She has been utilizing the clonazepam during the day for increased anxiety which has been slightly helpful  She has been tolerating the Wellbutrin 150 mg daily  She has been able to reduce smoking to seven cigarettes per day but continues to struggle with cessation  Sleeping well typically about 8 hours  Medications reviewed and updated  She denies suicidal ideation, intent or plan at present, has no suicidal ideation, intent or plan at present      She denies any auditory hallucinations and visual hallucinations, denies any other delusional thinking, denies any delusional thinking  She denies any side effects from medications    HPI ROS Appetite Changes and Sleep:  Variable appetite, normal sleep    Review Of Systems:      Constitutional Negative   ENT Negative   Cardiovascular Negative   Respiratory Negative   Gastrointestinal Negative   Genitourinary Negative   Musculoskeletal   Chronic knee pain   Integumentary Negative   Neurological Negative   Endocrine Negative   Other Symptoms Negative and None       Laboratory Results: No results found for this or any previous visit  Substance Abuse History:    Social History     Substance and Sexual Activity   Drug Use No       Family Psychiatric History:     No family history on file      The following portions of the patient's history were reviewed and updated as appropriate: past family history, past medical history, past social history, past surgical history and problem list     Social History     Socioeconomic History    Marital status: Single     Spouse name: Not on file    Number of children: Not on file    Years of education: Not on file    Highest education level: Not on file   Occupational History    Not on file   Social Needs    Financial resource strain: Not on file    Food insecurity     Worry: Not on file     Inability: Not on file    Transportation needs     Medical: Not on file     Non-medical: Not on file   Tobacco Use    Smoking status: Current Every Day Smoker     Packs/day: 1 50    Smokeless tobacco: Never Used   Substance and Sexual Activity    Alcohol use: No    Drug use: No    Sexual activity: Not on file   Lifestyle    Physical activity     Days per week: Not on file     Minutes per session: Not on file    Stress: Not on file   Relationships    Social connections     Talks on phone: Not on file     Gets together: Not on file     Attends Rastafarian service: Not on file     Active member of club or organization: Not on file     Attends meetings of clubs or organizations: Not on file     Relationship status: Not on file    Intimate partner violence     Fear of current or ex partner: Not on file     Emotionally abused: Not on file     Physically abused: Not on file     Forced sexual activity: Not on file   Other Topics Concern    Not on file   Social History Narrative    Not on file     Social History     Social History Narrative    Not on file        Social History     Tobacco History     Smoking Status  Current Every Day Smoker Smoking Frequency  1 5 packs/day    Smokeless Tobacco Use  Never Used          Alcohol History     Alcohol Use Status  No          Drug Use     Drug Use Status  No          Sexual Activity     Sexually Active  Not Asked          Activities of Daily Living    Not Asked                     OBJECTIVE:     Mental Status Evaluation:    Appearance age appropriate, casually dressed   Behavior  good eye contact, cooperative   Speech normal volume, normal pitch   Mood  dysthymic, anxious, tearful at times   Affect  congruent   Thought Processes logical   Associations intact associations   Thought Content normal   Perceptual Disturbances: none   Abnormal Thoughts  Risk Potential Suicidal ideation - None  Homicidal ideation - None  Potential for aggression - No   Orientation oriented to person, place, time/date and situation   Memory recent and remote memory grossly intact   Cosciousness alert and awake   Attention Span attention span and concentration are age appropriate   Intellect  not formally assessed   Insight age appropriate    Judgement good    Muscle Strength and  Gait  steady gait   Language no difficulty naming common objects   Fund of Knowledge displays adequate knowledge of current events   Pain  chronic knee pain, low back and buttock pain   Pain Scale  pain       Assessment/Plan:       Diagnoses and all orders for this visit:    Bipolar affective disorder, currently depressed, moderate (HCC)    Generalized anxiety disorder    Cigarette nicotine dependence without complication          Treatment Recommendations/Precautions:   continue with medications as follows    Lamictal 200 mg at bedtime   Wellbutrin  mg q a m  Clonazepam 1 mg half tab b i d  p r n  and two at bedtime   Seroquel 50 mg one HS   continue to encourage smoking cessation or reduction, have goal of reducing cigarettes a 5-6 per day  Follow-up two- three months and p r n  Continue current medications    Risks/Benefits      Risks, Benefits And Possible Side Effects Of Medications:    Risks, benefits, and possible side effects of medications explained to patient and patient verbalizes understanding and agreement for treatment      Controlled Medication Discussion:      taking as prescribed    Psychotherapy Provided:     Individual psychotherapy provided:  Yes, focused on multiple environmental stressors and coping

## 2021-03-04 NOTE — BH TREATMENT PLAN
TREATMENT PLAN (Medication Management Only)        [facility]    Name and Date of Birth:  Yumiko Sanchez 63 y o  1960  Date of Treatment Plan: March 4, 2021  Diagnosis/Diagnoses:    1  Bipolar affective disorder, currently depressed, moderate ([address] )    2  Generalized anxiety disorder    3  Cigarette nicotine dependence without complication      Strengths/Personal Resources for Self-Care: supportive family, supportive friends, taking medications as prescribed  Area/Areas of need (in own words): exercise more and reduce smoking 5-6 cigs per day  1  Long Term Goal: continue improvement in anxiety  Target Date:3 months  Person/Persons responsible for completion of goal: Freya  2  Short Term Objective (s) - How will we reach this goal?:   A  Provider new recommended medication/dosage changes and/or continue medication(s): continue current medications as prescribed  B  Go to gym regularly  C  N/A  Target Date:6 months - 9/4/2021  Person/Persons Responsible for Completion of Goal: Freya  Progress Towards Goals: continuing treatment  Treatment Modality: medication management every 6 months  Review due 180 days from date of this plan: 6 months - 9/4/2021  Expected length of service: ongoing treatment  My Physician/PA/NP and I have developed this plan together and I agree to work on the goals and objectives  I understand the treatment goals that were developed for my treatment

## 2021-05-25 DIAGNOSIS — F31.32 BIPOLAR AFFECTIVE DISORDER, CURRENTLY DEPRESSED, MODERATE (HCC): Primary | ICD-10-CM

## 2021-05-25 DIAGNOSIS — F41.1 GENERALIZED ANXIETY DISORDER: ICD-10-CM

## 2021-05-25 DIAGNOSIS — F31.32 BIPOLAR AFFECTIVE DISORDER, CURRENTLY DEPRESSED, MODERATE (HCC): ICD-10-CM

## 2021-05-25 RX ORDER — CLONAZEPAM 1 MG/1
TABLET ORAL
Qty: 270 TABLET | Refills: 0 | Status: SHIPPED | OUTPATIENT
Start: 2021-05-25 | End: 2021-06-17 | Stop reason: SDUPTHER

## 2021-06-17 ENCOUNTER — TELEMEDICINE (OUTPATIENT)
Dept: PSYCHIATRY | Facility: CLINIC | Age: 61
End: 2021-06-17
Payer: COMMERCIAL

## 2021-06-17 DIAGNOSIS — F31.9 BIPOLAR AFFECTIVE DISORDER, CURRENT EPISODE MODERATE (HCC): ICD-10-CM

## 2021-06-17 DIAGNOSIS — F31.32 BIPOLAR AFFECTIVE DISORDER, CURRENTLY DEPRESSED, MODERATE (HCC): Primary | ICD-10-CM

## 2021-06-17 DIAGNOSIS — F41.1 GENERALIZED ANXIETY DISORDER: ICD-10-CM

## 2021-06-17 DIAGNOSIS — F17.210 CIGARETTE NICOTINE DEPENDENCE WITHOUT COMPLICATION: ICD-10-CM

## 2021-06-17 PROCEDURE — 99443 PR PHYS/QHP TELEPHONE EVALUATION 21-30 MIN: CPT | Performed by: PHYSICIAN ASSISTANT

## 2021-06-17 RX ORDER — CLONAZEPAM 1 MG/1
TABLET ORAL
Qty: 270 TABLET | Refills: 0 | Status: SHIPPED | OUTPATIENT
Start: 2021-06-17 | End: 2021-09-17 | Stop reason: SDUPTHER

## 2021-06-17 RX ORDER — BUPROPION HYDROCHLORIDE 150 MG/1
150 TABLET ORAL EVERY MORNING
Qty: 90 TABLET | Refills: 1 | Status: SHIPPED | OUTPATIENT
Start: 2021-06-17 | End: 2022-02-02

## 2021-06-17 RX ORDER — LAMOTRIGINE 200 MG/1
200 TABLET ORAL
Qty: 90 TABLET | Refills: 1 | Status: SHIPPED | OUTPATIENT
Start: 2021-06-17 | End: 2022-03-31 | Stop reason: SDUPTHER

## 2021-06-17 RX ORDER — QUETIAPINE FUMARATE 50 MG/1
TABLET, FILM COATED ORAL
Qty: 90 TABLET | Refills: 1 | Status: SHIPPED | OUTPATIENT
Start: 2021-06-17 | End: 2021-12-13

## 2021-06-17 NOTE — PSYCH
Virtual Brief Visit    Assessment/Plan:    Problem List Items Addressed This Visit        Other    Bipolar affective disorder, current episode moderate (HCC) - Primary    Relevant Medications    QUEtiapine (SEROquel) 50 mg tablet    buPROPion (WELLBUTRIN XL) 150 mg 24 hr tablet    Generalized anxiety disorder    Relevant Medications    QUEtiapine (SEROquel) 50 mg tablet    buPROPion (WELLBUTRIN XL) 150 mg 24 hr tablet    lamoTRIgine (LaMICtal) 200 MG tablet      Other Visit Diagnoses     Cigarette nicotine dependence without complication                    Reason for visit is   Chief Complaint   Patient presents with    Follow-up    Medication Management    Virtual Brief Visit        Encounter provider Naheed Barajas PA-C    Provider located at [place] [id number]    Recent Visits  No visits were found meeting these conditions  Showing recent visits within past 7 days and meeting all other requirements  Today's Visits  Date Type Provider Dept   06[facility]17/[facility] visits and meeting all other requirements  Future Appointments  No visits were found meeting these conditions  Showing future appointments within next 150 days and meeting all other requirements       After connecting through telephone, the patient was identified by name and date of birth  Danielito Mack was informed that this is a telemedicine visit and that the visit is being conducted through telephone  My office door was closed  No one else was in the room  She acknowledged consent and understanding of privacy and security of the platform  The patient has agreed to participate and understands she can discontinue the visit at any time  Patient is aware this is a billable service  Subjective    Danielito Mack is a 57 y o  female   With history of bipolar disorder    HPI April Mood was seen follow-up of bipolar disorder, anxiety and medication check  She has had multiple stressors the past couple months  Her grandson was at an eye doctor appt and passed out, a friend in the family passed away at age 40,  Spouse continues with health issues and has G-tube, had disagreements with family members  Anxiety has been escalating due to multiple stressors  Depression has been increased as well but no suicidal ideation  No psychotic or symptoms  Taking her medications prescribed  No adverse effects  States she has a "don't care attitude" and has been back to smoking 15 cigarettes per day  She is sleeping well and appetite is good  Past Medical History:   Diagnosis Date    Generalized anxiety disorder 10/29/2014       No past surgical history on file  Current Outpatient Medications   Medication Sig Dispense Refill    ascorbic acid (VITAMIN C) 500 mg tablet TAKE 1 TABLET BY MOUTH EVERY DAY FOR 50 DAYS  0    atorvastatin (LIPITOR) 20 mg tablet Take 20 mg by mouth      buPROPion (WELLBUTRIN XL) 150 mg 24 hr tablet Take 1 tablet (150 mg total) by mouth every morning 90 tablet 1    celecoxib (CeleBREX) 200 mg capsule TAKE ONE CAPSULE BY MOUTH TWICE A DAY      Cholecalciferol (VITAMIN D3) 1000 units CAPS TAKE 1 CAPSULE (50,000 UNITS TOTAL) BY MOUTH ONCE A WEEK FOR 12 DOSES   clonazePAM (KlonoPIN) 1 mg tablet TAKE 1/2 TABLET BY MOUTH TWICE DAILY, AND 2 TABLETS AT BEDTIME AS NEEDED  270 tablet 0    clopidogrel (PLAVIX) 75 mg tablet Take 4 tablets (300 mg) on day one then take 1 tablet daily after that  Start after brilinta gone        fenofibrate (FENOGLIDE) 120 MG TABS Take 120 mg by mouth      fluticasone furoate-vilanterol (BREO ELLIPTA) Inhale 1 puff      lamoTRIgine (LaMICtal) 200 MG tablet Take 1 tablet (200 mg total) by mouth daily at bedtime 90 tablet 1    LINZESS 145 MCG CAPS 1 (ONE) CAPSULE 30 MIN PRIOR TO MEAL  1    metoprolol succinate (TOPROL-XL) 25 mg 24 hr tablet Take 25 mg by mouth      metoprolol tartrate (LOPRESSOR) 50 mg tablet Take 25 mg by mouth 2 (two) times a day      Multiple Vitamin (DAILY VALUE MULTIVITAMIN) TABS Take 1 tablet by mouth      Omega-3 Fatty Acids (FISH OIL) 1,000 mg Take 1,000 mg by mouth daily      pantoprazole (PROTONIX) 40 mg tablet Take by mouth      PROAIR RESPICLICK 462 (90 Base) MCG/ACT AEPB Inhale 2 puffs every 4 (four) hours as needed  3    QUEtiapine (SEROquel) 50 mg tablet 1 po qhs 90 tablet 1     No current facility-administered medications for this visit  Allergies   Allergen Reactions    Latex Rash       Review of Systems  As Noted in HPI     Mental Status exam:    Speech is clear and coherent, normal rate and volume   Mood is dysthymic anxious   Linear and coherent thought  process   no suicidal or homicidal ideation   no psychotic signs or symptoms, no hallucinations or delusions   Cognition is intact   insight and judgment is intact    Continue medications treatment plan as follows:  Lamotrigine 200 mg at bedtime   Wellbutrin  mg q a m  Clonazepam 1 mg half b i d  p r n and two at bedtime   Seroquel 50 mg at bedtime   continuing to work on smoking reduction  Will follow-up in three months and she will call sooner any questions concerns  There were no vitals filed for this visit  I spent 30 minutes directly with the patient during this visit    VIRTUAL VISIT One Vinny Pringle acknowledges that she has consented to an online visit or consultation  She understands that the online visit is based solely on information provided by her, and that, in the absence of a face-to-face physical evaluation by the physician, the diagnosis she receives is both limited and provisional in terms of accuracy and completeness  This is not intended to replace a full medical face-to-face evaluation by the physician  Manish Arciniega understands and accepts these terms

## 2021-07-29 ENCOUNTER — TELEPHONE (OUTPATIENT)
Dept: PSYCHIATRY | Facility: CLINIC | Age: 61
End: 2021-07-29

## 2021-09-17 ENCOUNTER — TELEMEDICINE (OUTPATIENT)
Dept: PSYCHIATRY | Facility: CLINIC | Age: 61
End: 2021-09-17
Payer: COMMERCIAL

## 2021-09-17 DIAGNOSIS — F41.1 GENERALIZED ANXIETY DISORDER: ICD-10-CM

## 2021-09-17 DIAGNOSIS — F31.32 BIPOLAR AFFECTIVE DISORDER, CURRENTLY DEPRESSED, MODERATE (HCC): Primary | ICD-10-CM

## 2021-09-17 PROCEDURE — 99213 OFFICE O/P EST LOW 20 MIN: CPT | Performed by: PHYSICIAN ASSISTANT

## 2021-09-17 PROCEDURE — 90833 PSYTX W PT W E/M 30 MIN: CPT | Performed by: PHYSICIAN ASSISTANT

## 2021-09-17 RX ORDER — CLONAZEPAM 1 MG/1
TABLET ORAL
Qty: 270 TABLET | Refills: 0 | Status: SHIPPED | OUTPATIENT
Start: 2021-09-17 | End: 2022-03-01

## 2021-09-17 NOTE — BH TREATMENT PLAN
TREATMENT PLAN (Medication Management Only)        [facility]    Name and Date of Birth:  Michelle Lucero 57 y o  1960  Date of Treatment Plan: September 17, 2021  Diagnosis/Diagnoses:    1  Bipolar affective disorder, currently depressed, moderate ([address] )    2  Generalized anxiety disorder      Strengths/Personal Resources for Self-Care: supportive family, taking medications as prescribed  Area/Areas of need (in own words): anxiety symptoms,  Stressors with spouse  1  Long Term Goal: improve control of anxiety  Target Date:2 months - 11/17/2021  Person/Persons responsible for completion of goal: Freya  2  Short Term Objective (s) - How will we reach this goal?:   A  Provider new recommended medication/dosage changes and/or continue medication(s): continue current medications as prescribed  B  N/A   C  N/A  Target Date:2 months - 11/17/2021  Person/Persons Responsible for Completion of Goal: Freya  Progress Towards Goals: continuing treatment  Treatment Modality: medication management every 3 months  Review due 180 days from date of this plan: 6 months - 3/17/2022  Expected length of service: ongoing treatment  My Physician/PA/NP and I have developed this plan together and I agree to work on the goals and objectives  I understand the treatment goals that were developed for my treatment

## 2021-09-17 NOTE — PSYCH
Virtual Regular Visit    Verification of patient location:    Patient is located in the following state in which I hold an active license [place]      Assessment/Plan:    Problem List Items Addressed This Visit        Other    Bipolar affective disorder, current episode moderate ([address] ) - Primary    Relevant Medications    clonazePAM (KlonoPIN) 1 mg tablet    Generalized anxiety disorder    Relevant Medications    clonazePAM (KlonoPIN) 1 mg tablet          Goals addressed in session: Goal 1          Reason for visit is   Chief Complaint   Patient presents with    Follow-up    Medication Management    Virtual Regular Visit        Encounter provider Omega Nyhan, PA-C    Provider located at [facility] [phone]      Recent Visits  No visits were found meeting these conditions  Showing recent visits within past 7 days and meeting all other requirements  Today's Visits  Date Type Provider Dept   09/17/21 Telemedicine Omega Nyhan, PA-C [address] today's visits and meeting all other requirements  Future Appointments  No visits were found meeting these conditions  Showing future appointments within next 150 days and meeting all other requirements       The patient was identified by name and date of birth  Corrina Quintana was informed that this is a telemedicine visit and that the [facility] and patient was informed that this is a secure, HIPAA-compliant platform  She agrees to proceed     My office door was closed  No one else was in the room  She acknowledged consent and understanding of privacy and security of the video platform  The patient has agreed to participate and understands they can discontinue the visit at any time  Patient is aware this is a billable service       Subjective  Corrina Quintana is a 57 y o  female  With history of bipolar disorder and anxiety   HPI     Selena Temple was seen for follow-up of bipolar disorder, anxiety and medication management  Unfortunately she has been increasingly depressed  Her spouse has been in the hospital and is on life support  States that she is meeting with the medical team on Tuesday to discuss next steps  States that she will have to take him off life support or he will likely be in a long-term care facility  Understandably she is very tearful and depressed when discussing this  States that she is not sleeping well at night due to having visions of her  in ICU cooked up to all the machines and tubing  Reports that she has no appetite has not been eating well  Very tearful and depressed, anxious  Denies suicidal or homicidal ideation  States that she does have a good support system though  States that her daughter is going with her to the hospital for this meeting  She also has of strong meryl and is involved with a Christian  And Bible study  Medically she has been having continued significant back pain  States that surgery was suggested but she is unable to plan this due to the uncertainty surrounding her spouse  She has significant back pain which has been ongoing  She is taking her medications as prescribed and denies any adverse effects with her medications  States that the Klonopin is "up to take the edge off "  Denies any lethargy or sedation from this medication  She is aware of potential for adverse effects with this medication including increased sedation, gait instability, respiratory depression especially with concomitant pain medication or alcohol  She has been taking her medications as prescribed  According to PDMP and tolerating  Past Medical History:   Diagnosis Date    Generalized anxiety disorder 10/29/2014       No past surgical history on file      Current Outpatient Medications   Medication Sig Dispense Refill    ascorbic acid (VITAMIN C) 500 mg tablet TAKE 1 TABLET BY MOUTH EVERY DAY FOR 50 DAYS  0    atorvastatin (LIPITOR) 20 mg tablet Take 20 mg by mouth      buPROPion (WELLBUTRIN XL) 150 mg 24 hr tablet Take 1 tablet (150 mg total) by mouth every morning 90 tablet 1    celecoxib (CeleBREX) 200 mg capsule TAKE ONE CAPSULE BY MOUTH TWICE A DAY      Cholecalciferol (VITAMIN D3) 1000 units CAPS TAKE 1 CAPSULE (50,000 UNITS TOTAL) BY MOUTH ONCE A WEEK FOR 12 DOSES   clonazePAM (KlonoPIN) 1 mg tablet TAKE 1/2 TABLET BY MOUTH TWICE DAILY, AND 2 TABLETS AT BEDTIME AS NEEDED  270 tablet 0    clopidogrel (PLAVIX) 75 mg tablet Take 4 tablets (300 mg) on day one then take 1 tablet daily after that  Start after brilinta gone   fenofibrate (FENOGLIDE) 120 MG TABS Take 120 mg by mouth      fluticasone furoate-vilanterol (BREO ELLIPTA) Inhale 1 puff      lamoTRIgine (LaMICtal) 200 MG tablet Take 1 tablet (200 mg total) by mouth daily at bedtime 90 tablet 1    LINZESS 145 MCG CAPS 1 (ONE) CAPSULE 30 MIN PRIOR TO MEAL  1    metoprolol succinate (TOPROL-XL) 25 mg 24 hr tablet Take 25 mg by mouth      Multiple Vitamin (DAILY VALUE MULTIVITAMIN) TABS Take 1 tablet by mouth      Omega-3 Fatty Acids (FISH OIL) 1,000 mg Take 1,000 mg by mouth daily      pantoprazole (PROTONIX) 40 mg tablet Take by mouth      PROAIR RESPICLICK 846 (90 Base) MCG/ACT AEPB Inhale 2 puffs every 4 (four) hours as needed  3    QUEtiapine (SEROquel) 50 mg tablet 1 po qhs 90 tablet 1     No current facility-administered medications for this visit  Allergies   Allergen Reactions    Latex Rash       Review of Systems    As noted in HPI     Video Exam    There were no vitals filed for this visit      Physical Exam   Mental status exam:   Speech is clear and coherent, normal rate and volume   Adequate hygiene, good eye contact   Mood is dysthymic, anxious, tearful, affect is congruent   No suicidal or homicidal ideation   circumstantial thought process  No psychotic signs or symptoms noted, no hallucinations or delusions were voiced   Cognition appears stable   Insight and judgment stable     Medications treatment plan as follows:  Lamotrigine 200 mg at bedtime   Wellbutrin  mg q a m  Clonazepam 1 mg half b i d  p r n  and taking two at bedtime  Seroquel 50 mg at bedtime  Supportive psychotherapy focused on grief in stressors surrounding current situation with spouse     she will call for a follow-up due to uncertainty regarding spouse and she will call sooner if any questions, concerns  I spent 30 minutes directly with the patient during this visit    VIRTUAL VISIT One Vinny Pringle verbally agrees to participate in [employer]  Pt is aware that [employer] could be limited without vital signs or the ability to perform a full hands-on physical exam  Freya Negro understands she or the provider may request at any time to terminate the video visit and request the patient to seek care or treatment in person

## 2022-03-31 ENCOUNTER — OFFICE VISIT (OUTPATIENT)
Dept: PSYCHIATRY | Facility: CLINIC | Age: 62
End: 2022-03-31
Payer: COMMERCIAL

## 2022-03-31 DIAGNOSIS — F31.32 BIPOLAR AFFECTIVE DISORDER, CURRENTLY DEPRESSED, MODERATE (HCC): Primary | ICD-10-CM

## 2022-03-31 DIAGNOSIS — F31.9 BIPOLAR AFFECTIVE DISORDER, CURRENT EPISODE MODERATE (HCC): ICD-10-CM

## 2022-03-31 DIAGNOSIS — F41.1 GENERALIZED ANXIETY DISORDER: ICD-10-CM

## 2022-03-31 PROCEDURE — 99214 OFFICE O/P EST MOD 30 MIN: CPT | Performed by: PHYSICIAN ASSISTANT

## 2022-03-31 RX ORDER — BUPROPION HYDROCHLORIDE 150 MG/1
150 TABLET ORAL EVERY MORNING
Qty: 90 TABLET | Refills: 1 | Status: SHIPPED | OUTPATIENT
Start: 2022-03-31 | End: 2022-07-08

## 2022-03-31 RX ORDER — GABAPENTIN 300 MG/1
300 CAPSULE ORAL 3 TIMES DAILY
COMMUNITY
Start: 2022-01-02

## 2022-03-31 RX ORDER — CLONAZEPAM 1 MG/1
TABLET ORAL
Qty: 270 TABLET | Refills: 0 | Status: SHIPPED | OUTPATIENT
Start: 2022-03-31 | End: 2022-07-08 | Stop reason: SDUPTHER

## 2022-03-31 RX ORDER — LAMOTRIGINE 200 MG/1
200 TABLET ORAL
Qty: 90 TABLET | Refills: 1 | Status: SHIPPED | OUTPATIENT
Start: 2022-03-31

## 2022-03-31 RX ORDER — QUETIAPINE FUMARATE 50 MG/1
TABLET, FILM COATED ORAL
Qty: 90 TABLET | Refills: 1 | Status: SHIPPED | OUTPATIENT
Start: 2022-03-31

## 2022-03-31 NOTE — PSYCH
This note was not shared with the patient due to patient requested    PROGRESS NOTE        [facility]      Name and Date of Birth:  Lilliana Resendiz 57 y o  1960    Date of Visit: 22    SUBJECTIVE:  [facility] was seen for follow-up bipolar disorder, anxiety and for medication management  Patient reports that she has been feeling more depressed and down  Continues to grieve loss of her spouse  She has also continued to have multiple medical issues  States on the day of her spouse is  she had fall and fractured her wrist which required surgery  Also states that she was diagnosed with a cyst in her back and needs surgery for that  States that she has been unable to do that because she was told she will need help  Her daughter works full-time as a teacher but she may consider having this done over the summers or daughter would be able to help her  She has limited motivation and interest   Sleep is fair  Appetite has been adequate  Continues to smoke about five cigarettes per day  Takes her medications as prescribed  States that her anxiety has been higher at times and she had a panic attack that lasted for 1 hour a couple months ago  She denies suicidal ideation, intent or plan at present, has no suicidal ideation, intent or plan at present  She denies any auditory hallucinations and visual hallucinations, denies any other delusional thinking, denies any delusional thinking  She denies any side effects from medications      HPI ROS Appetite Changes and Sleep: normal appetite, normal sleep    Review Of Systems:      Constitutional Negative   ENT Negative   Cardiovascular Negative   Respiratory Negative   Gastrointestinal Negative   Genitourinary Negative   Musculoskeletal Back pain, leg pain   Integumentary Negative   Neurological Negative   Endocrine Negative   Other Symptoms Negative and None       Laboratory Results: No results found for this or any previous visit  Substance Abuse History:    Social History     Substance and Sexual Activity   Drug Use No       Family Psychiatric History:     No family history on file      The following portions of the patient's history were reviewed and updated as appropriate: past family history, past medical history, past social history, past surgical history and problem list     Social History     Socioeconomic History    Marital status: Single     Spouse name: Not on file    Number of children: Not on file    Years of education: Not on file    Highest education level: Not on file   Occupational History    Not on file   Tobacco Use    Smoking status: Current Every Day Smoker     Packs/day: 1 50    Smokeless tobacco: Never Used   Substance and Sexual Activity    Alcohol use: No    Drug use: No    Sexual activity: Not on file   Other Topics Concern    Not on file   Social History Narrative    Not on file     Social Determinants of Health     Financial Resource Strain: Not on file   Food Insecurity: Not on file   Transportation Needs: Not on file   Physical Activity: Not on file   Stress: Not on file   Social Connections: Not on file   Intimate Partner Violence: Not on file   Housing Stability: Not on file     Social History     Social History Narrative    Not on file        Social History     Tobacco History     Smoking Status  Current Every Day Smoker Smoking Frequency  1 5 packs/day    Smokeless Tobacco Use  Never Used          Alcohol History     Alcohol Use Status  No          Drug Use     Drug Use Status  No          Sexual Activity     Sexually Active  Not Asked          Activities of Daily Living    Not Asked                     OBJECTIVE:     Mental Status Evaluation:    Appearance age appropriate, casually dressed   Behavior Calm, cooperative   Speech normal volume, normal pitch   Mood Depressed   Affect Constricted   Thought Processes logical   Associations intact associations   Thought Content Negative   Perceptual Disturbances: none   Abnormal Thoughts  Risk Potential Suicidal ideation - None  Homicidal ideation - None  Potential for aggression - No   Orientation oriented to person, place, time/date and situation   Memory recent and remote memory grossly intact   Cosciousness alert and awake   Attention Span attention span and concentration are age appropriate   Intellect Not formally assessed   Insight age appropriate    Judgement Fair   Muscle Strength and  Gait Slow gait   Language no difficulty naming common objects   Fund of Knowledge displays adequate knowledge of current events   Pain Back pain, leg pain   Pain Scale Pain       Assessment/Plan:       Diagnoses and all orders for this visit:    Bipolar affective disorder, currently depressed, moderate (HCC)  -     buPROPion (WELLBUTRIN XL) 150 mg 24 hr tablet; Take 1 tablet (150 mg total) by mouth every morning  -     clonazePAM (KlonoPIN) 1 mg tablet; 1/2 po bid and 2 po qhs prn    Generalized anxiety disorder  -     QUEtiapine (SEROquel) 50 mg tablet; TAKE 1 TABLET BY MOUTH EVERYDAY AT BEDTIME  -     clonazePAM (KlonoPIN) 1 mg tablet; 1/2 po bid and 2 po qhs prn  -     lamoTRIgine (LaMICtal) 200 MG tablet; Take 1 tablet (200 mg total) by mouth daily at bedtime    Bipolar affective disorder, current episode moderate (HCC)  -     QUEtiapine (SEROquel) 50 mg tablet; TAKE 1 TABLET BY MOUTH EVERYDAY AT BEDTIME    Other orders  -     gabapentin (NEURONTIN) 300 mg capsule;  Take 300 mg by mouth 3 (three) times a day          Treatment Recommendations/Precautions:  Continue Seroquel 50 mg at bedtime   Wellbutrin  mg the morning   Discussed potential for this medication to intensify effects of her more Toprol all which she is aware of   Continue clonazepam half tab b i d  p r n , encouraged to try to avoid during the day if able   Continue clonazepam at bedtime  Aware of potential for adverse effects with benzodiazepines including dependence, sedation, respiratory depression especially taking calmly with narcotics or alcohol   Continue lamotrigine 200 mg daily  She will follow-up with Dr Kelly Morse regarding her back  States that he also prescribed her gabapentin 300 mg 3 times a day   Will follow-up in three months and she will call sooner if any questions or concerns    Continue current medications    Risks/Benefits      Risks, Benefits And Possible Side Effects Of Medications:    Risks, benefits, and possible side effects of medications explained to patient and patient verbalizes understanding and agreement for treatment      Controlled Medication Discussion:     PDMP reviewed  Psychotherapy Provided:     Individual psychotherapy provided: No

## 2022-03-31 NOTE — BH TREATMENT PLAN
TREATMENT PLAN (Medication Management Only)        [facility]    Name and Date of Birth:  Marianne Schwarz 57 y o  1960  Date of Treatment Plan: March 31, 2022  Diagnosis/Diagnoses:    1  Bipolar affective disorder, currently depressed, moderate ([facility] [address] )    2  Generalized anxiety disorder    3  Bipolar affective disorder, current episode moderate ([facility] [address] )      Strengths/Personal Resources for Self-Care: supportive family, taking medications as prescribed  Area/Areas of need (in own words): depressive symptoms  1  Long Term Goal: continue improvement in depression  Target Date:6 months - 9/30/2022  Person/Persons responsible for completion of goal: Freya  2  Short Term Objective (s) - How will we reach this goal?:   A  Provider new recommended medication/dosage changes and/or continue medication(s): continue current medications as prescribed  B  N/A   C  N/A  Target Date:6 months - 9/30/2022  Person/Persons Responsible for Completion of Goal: Freya  Progress Towards Goals: continuing treatment  Treatment Modality: medication management every 6 weeks  Review due 180 days from date of this plan: 6 months - 9/30/2022  Expected length of service: ongoing treatment  My Physician/PA/NP and I have developed this plan together and I agree to work on the goals and objectives  I understand the treatment goals that were developed for my treatment

## 2022-07-08 ENCOUNTER — OFFICE VISIT (OUTPATIENT)
Dept: PSYCHIATRY | Facility: CLINIC | Age: 62
End: 2022-07-08
Payer: COMMERCIAL

## 2022-07-08 DIAGNOSIS — F41.1 GENERALIZED ANXIETY DISORDER: ICD-10-CM

## 2022-07-08 DIAGNOSIS — F43.81 PROLONGED GRIEF REACTION: ICD-10-CM

## 2022-07-08 DIAGNOSIS — F31.32 BIPOLAR AFFECTIVE DISORDER, CURRENTLY DEPRESSED, MODERATE (HCC): Primary | ICD-10-CM

## 2022-07-08 PROCEDURE — 99214 OFFICE O/P EST MOD 30 MIN: CPT | Performed by: PHYSICIAN ASSISTANT

## 2022-07-08 RX ORDER — CYCLOSPORINE 0.5 MG/ML
EMULSION OPHTHALMIC
COMMUNITY
Start: 2022-04-27

## 2022-07-08 RX ORDER — CLONAZEPAM 1 MG/1
TABLET ORAL
Qty: 270 TABLET | Refills: 0 | Status: SHIPPED | OUTPATIENT
Start: 2022-07-08 | End: 2022-10-07 | Stop reason: SDUPTHER

## 2022-07-08 RX ORDER — DESVENLAFAXINE 50 MG/1
50 TABLET, EXTENDED RELEASE ORAL DAILY
Qty: 90 TABLET | Refills: 1 | Status: SHIPPED | OUTPATIENT
Start: 2022-07-08 | End: 2022-10-07 | Stop reason: SDUPTHER

## 2022-07-08 NOTE — PSYCH
This note was not shared with the patient due to reasonable likelihood of causing patient harm        PROGRESS NOTE        [address] [facility]      Name and Date of Birth:  Renita Score 60 y o  1960    Date of Visit: 07/08/22    SUBJECTIVE:  Disha Roberts was seen for follow-up of bipolar disorder, anxiety and for medication management  Continues to feel sad, depressed, easily tearful today  Frustrated with medical issues  She was told she has a cyst on her spine  Following with a back specialist and also a surgeon  Has been having some difficulty with pursuing surgery though and told that her insurance was mandating her to have physical therapy 1st   Frustrated with her doctors and feel as though they do not want to help her  States that she is not sleeping well at night  Continues with little interest and motivation  States that she has difficulty accomplishing things during the day due to her pain  States that she will do something for few minutes then she will need to sit and rest     Continues to grieve loss of her  nine months ago  Taking her medications as prescribed  Has been taking Wellbutrin, Seroquel, clonazepam and lamotrigine  On a positive no reports good relationship with her family  Also has a small dog that she enjoys  She denies suicidal ideation, intent or plan at present, has no suicidal ideation, intent or plan at present  She denies any auditory hallucinations and visual hallucinations, denies any other delusional thinking, denies any delusional thinking  She denies any side effects from medications      HPI ROS Appetite Changes and Sleep: normal appetite, decreased sleep    Review Of Systems:      Constitutional Negative   ENT Negative   Cardiovascular Negative   Respiratory Negative   Gastrointestinal Negative   Genitourinary Negative   Musculoskeletal Negative   Integumentary Negative   Neurological Negative Endocrine Negative   Other Symptoms Negative and None       Laboratory Results: No results found for this or any previous visit  Substance Abuse History:    Social History     Substance and Sexual Activity   Drug Use No       Family Psychiatric History:     No family history on file      The following portions of the patient's history were reviewed and updated as appropriate: past family history, past medical history, past social history, past surgical history and problem list     Social History     Socioeconomic History    Marital status: Single     Spouse name: Not on file    Number of children: Not on file    Years of education: Not on file    Highest education level: Not on file   Occupational History    Not on file   Tobacco Use    Smoking status: Current Every Day Smoker     Packs/day: 1 50    Smokeless tobacco: Never Used   Substance and Sexual Activity    Alcohol use: No    Drug use: No    Sexual activity: Not on file   Other Topics Concern    Not on file   Social History Narrative    Not on file     Social Determinants of Health     Financial Resource Strain: Not on file   Food Insecurity: Not on file   Transportation Needs: Not on file   Physical Activity: Not on file   Stress: Not on file   Social Connections: Not on file   Intimate Partner Violence: Not on file   Housing Stability: Not on file     Social History     Social History Narrative    Not on file        Social History     Tobacco History     Smoking Status  Current Every Day Smoker Smoking Frequency  1 5 packs/day    Smokeless Tobacco Use  Never Used          Alcohol History     Alcohol Use Status  No          Drug Use     Drug Use Status  No          Sexual Activity     Sexually Active  Not Asked          Activities of Daily Living    Not Asked                     OBJECTIVE:     Mental Status Evaluation:    Appearance age appropriate, casually dressed   Behavior Cooperative, good eye contact   Speech normal volume, normal pitch Mood Anxious, depressed   Affect Depressed, tearful at times   Thought Processes Circumstantial   Associations intact associations   Thought Content normal   Perceptual Disturbances: none   Abnormal Thoughts  Risk Potential Suicidal ideation - None  Homicidal ideation - None  Potential for aggression - No   Orientation oriented to person, place, time/date and situation   Memory recent and remote memory grossly intact   Cosciousness alert and awake   Attention Span attention span and concentration are age appropriate   Intellect Not formally assessed   Insight age appropriate    Judgement Fair   Muscle Strength and  Gait Slow gait   Language no difficulty naming common objects   Fund of Knowledge displays adequate knowledge of current events   Pain Back and leg pain   Pain Scale pain       Assessment/Plan:       Diagnoses and all orders for this visit:    Bipolar affective disorder, currently depressed, moderate (HCC)  -     desvenlafaxine succinate (PRISTIQ) 50 mg 24 hr tablet; Take 1 tablet (50 mg total) by mouth daily  -     clonazePAM (KlonoPIN) 1 mg tablet; 1/2 po bid and 2 po qhs prn    Generalized anxiety disorder  -     desvenlafaxine succinate (PRISTIQ) 50 mg 24 hr tablet; Take 1 tablet (50 mg total) by mouth daily  -     clonazePAM (KlonoPIN) 1 mg tablet; 1/2 po bid and 2 po qhs prn    Prolonged grief reaction    Other orders  -     Restasis 0 05 % ophthalmic emulsion; INSTILL 1 DROP INTO BOTH EYES TWICE A DAY          Treatment Recommendations/Precautions:  Continue Wellbutrin  mg q a m     Start Pristiq 50 mg daily  Had been on Cymbalta in the past but had GI symptoms so this was discontinued  Seroquel 50 mg at bedtime   Clonazepam 1 mg half a tab twice a day and two at bedtime as needed  Continue Lamotrigine 200 mg daily   Aware of potential for adverse effects with benzodiazepines which we have discussed and aware of plan to attempt further titration in the future  She has decompensated with attempts at reduction         Continue current medications:    Risks/Benefits      Risks, Benefits And Possible Side Effects Of Medications:    Risks, benefits, and possible side effects of medications explained to patient and patient verbalizes understanding and agreement for treatment      Controlled Medication Discussion:     Not applicable    Psychotherapy Provided:     Individual psychotherapy provided: No

## 2022-07-11 ENCOUNTER — TELEPHONE (OUTPATIENT)
Dept: PSYCHIATRY | Facility: CLINIC | Age: 62
End: 2022-07-11

## 2022-07-11 NOTE — TELEPHONE ENCOUNTER
Maryann Coronado called said that her insurance doesn't cover Pristiq wanted to know what to do next

## 2022-07-11 NOTE — TELEPHONE ENCOUNTER
Prior Authorization approval for Desvenlafaxine 50 MG ER tablets good 4/12/22 - 7/10/25  Called pharmacy and informed them of approval     LM on Freya's KATHY that medication has been approved

## 2022-07-11 NOTE — TELEPHONE ENCOUNTER
Call to [facility] to initiate Prior Authorization for Desvenlafaxine 50 MG ER tablets  PA initiated by them and providing me key of [id number]  Prior Authorization completed via Rajendra  Patient ID # [id number]  Decision pending  Will refer to Lisy Carreno for her information

## 2022-09-06 ENCOUNTER — TELEPHONE (OUTPATIENT)
Dept: PSYCHIATRY | Facility: CLINIC | Age: 62
End: 2022-09-06

## 2022-10-07 ENCOUNTER — TELEMEDICINE (OUTPATIENT)
Dept: PSYCHIATRY | Facility: CLINIC | Age: 62
End: 2022-10-07
Payer: COMMERCIAL

## 2022-10-07 DIAGNOSIS — F31.9 BIPOLAR AFFECTIVE DISORDER, CURRENT EPISODE MODERATE (HCC): ICD-10-CM

## 2022-10-07 DIAGNOSIS — F31.32 BIPOLAR AFFECTIVE DISORDER, CURRENTLY DEPRESSED, MODERATE (HCC): Primary | ICD-10-CM

## 2022-10-07 DIAGNOSIS — F41.1 GENERALIZED ANXIETY DISORDER: ICD-10-CM

## 2022-10-07 PROCEDURE — 99214 OFFICE O/P EST MOD 30 MIN: CPT | Performed by: PHYSICIAN ASSISTANT

## 2022-10-07 RX ORDER — TRAMADOL HYDROCHLORIDE 50 MG/1
TABLET ORAL
COMMUNITY
Start: 2022-09-30

## 2022-10-07 RX ORDER — QUETIAPINE FUMARATE 50 MG/1
TABLET, FILM COATED ORAL
Qty: 90 TABLET | Refills: 1 | Status: SHIPPED | OUTPATIENT
Start: 2022-10-07

## 2022-10-07 RX ORDER — LAMOTRIGINE 200 MG/1
200 TABLET ORAL
Qty: 90 TABLET | Refills: 1 | Status: SHIPPED | OUTPATIENT
Start: 2022-10-07

## 2022-10-07 RX ORDER — DESVENLAFAXINE 50 MG/1
50 TABLET, EXTENDED RELEASE ORAL DAILY
Qty: 90 TABLET | Refills: 1 | Status: SHIPPED | OUTPATIENT
Start: 2022-10-07

## 2022-10-07 RX ORDER — CLONAZEPAM 1 MG/1
TABLET ORAL
Qty: 270 TABLET | Refills: 1 | Status: SHIPPED | OUTPATIENT
Start: 2022-10-07

## 2022-10-07 NOTE — PSYCH
This note was not shared with the patient due to reasonable likelihood of causing patient harm    Virtual Regular Visit    Verification of patient location:    Patient is located in the following state in which I hold an active license [place]      Assessment/Plan:    Problem List Items Addressed This Visit        Other    Bipolar affective disorder, current episode moderate (HCC) - Primary    Relevant Medications    clonazePAM (KlonoPIN) 1 mg tablet    QUEtiapine (SEROquel) 50 mg tablet    desvenlafaxine succinate (PRISTIQ) 50 mg 24 hr tablet    Generalized anxiety disorder    Relevant Medications    clonazePAM (KlonoPIN) 1 mg tablet    lamoTRIgine (LaMICtal) 200 MG tablet    QUEtiapine (SEROquel) 50 mg tablet    desvenlafaxine succinate (PRISTIQ) 50 mg 24 hr tablet          Goals addressed in session: Goal 1          Reason for visit is   Chief Complaint   Patient presents with    Virtual Regular Visit        Encounter provider Linda Nice PA-C    Provider located at [facility] [id number]      Recent Visits  No visits were found meeting these conditions  Showing recent visits within past 7 days and meeting all other requirements  Today's Visits  Date Type Provider Dept   10/07/22 [address] [place], [place] [address] 71 today's visits and meeting all other requirements  Future Appointments  No visits were found meeting these conditions  Showing future appointments within next 150 days and meeting all other requirements       The patient was identified by name and date of birth  Renita Michi was informed that this is a telemedicine visit and that the [facility] and patient was informed this is a secure, HIPAA-complaint platform  She agrees to proceed     My office door was closed  No one else was in the room    She acknowledged consent and understanding of privacy and security of the video platform  The patient has agreed to participate and understands they can discontinue the visit at any time  Patient is aware this is a billable service  Subjective  Alyce Yi is a 60 y o  female with bipolar disorder   HPI   Abigail Mann was seen for follow up and management  Overall she reports that she is feeling better with pristiq and discontinuation of wellbutrin  States she is feeling less tearful, slightly less depressed  Anxiety has been high at times  She has been having more medical issues the past few moths  She had back surgery for cyst a week ago and is recuperating form that  It has been a year since her spouse passed away  Continues to grieve loss of spouse  Fair sleep and appetite good  Past Medical History:   Diagnosis Date    Generalized anxiety disorder 10/29/2014       No past surgical history on file  Current Outpatient Medications   Medication Sig Dispense Refill    clonazePAM (KlonoPIN) 1 mg tablet 1/2 po bid and 2 po qhs prn 270 tablet 1    desvenlafaxine succinate (PRISTIQ) 50 mg 24 hr tablet Take 1 tablet (50 mg total) by mouth daily 90 tablet 1    lamoTRIgine (LaMICtal) 200 MG tablet Take 1 tablet (200 mg total) by mouth daily at bedtime 90 tablet 1    QUEtiapine (SEROquel) 50 mg tablet TAKE 1 TABLET BY MOUTH EVERYDAY AT BEDTIME 90 tablet 1    ascorbic acid (VITAMIN C) 500 mg tablet TAKE 1 TABLET BY MOUTH EVERY DAY FOR 50 DAYS  0    atorvastatin (LIPITOR) 20 mg tablet Take 20 mg by mouth      celecoxib (CeleBREX) 200 mg capsule TAKE ONE CAPSULE BY MOUTH TWICE A DAY      Cholecalciferol (VITAMIN D3) 1000 units CAPS TAKE 1 CAPSULE (50,000 UNITS TOTAL) BY MOUTH ONCE A WEEK FOR 12 DOSES   clopidogrel (PLAVIX) 75 mg tablet Take 4 tablets (300 mg) on day one then take 1 tablet daily after that  Start after brilinta gone        fenofibrate (FENOGLIDE) 120 MG TABS Take 120 mg by mouth      fluticasone furoate-vilanterol (BREO ELLIPTA) Inhale 1 puff      gabapentin (NEURONTIN) 300 mg capsule Take 300 mg by mouth 3 (three) times a day      LINZESS 145 MCG CAPS 1 (ONE) CAPSULE 30 MIN PRIOR TO MEAL  1    metoprolol succinate (TOPROL-XL) 25 mg 24 hr tablet Take 25 mg by mouth      Multiple Vitamin (DAILY VALUE MULTIVITAMIN) TABS Take 1 tablet by mouth      Omega-3 Fatty Acids (FISH OIL) 1,000 mg Take 1,000 mg by mouth daily      pantoprazole (PROTONIX) 40 mg tablet Take by mouth      PROAIR RESPICLICK 347 (90 Base) MCG/ACT AEPB Inhale 2 puffs every 4 (four) hours as needed  3    Restasis 0 05 % ophthalmic emulsion INSTILL 1 DROP INTO BOTH EYES TWICE A DAY      traMADol (ULTRAM) 50 mg tablet TAKE 1 TABLET (50 MG TOTAL) BY MOUTH EVERY 6 HOURS AS NEEDED FOR MODERATE PAIN (PAIN SCORE 4-6)       No current facility-administered medications for this visit  Allergies   Allergen Reactions    Latex Rash       Review of Systems  As noted in HPI  Video Exam    There were no vitals filed for this visit      Physical Exam   MSE:  Pt with clear and coherent speech,  Normal rate and volume,  Adequate hygiene, good eye contact,  Affect dysthymic, mood congruent,  Circumstantial thought process,  No suicidal or homicidal ideation  Cognition fair  Insight and judgement fair    Medications as follows:  Pristiq 50 mg po qd  Seroquel 50 gm po qhs  Lamictal 200 mg po qd  Klonopin 1 mg 1/2 bid and 2 hs  Has deteriorated with attempts at reduction of klonopin and is functioning at her highest level  She is aware of potential side effects with benzos and tramadol of respiratory depression  Will be taking advil for pain  Follow up 3 months and prn    I spent 30 minutes directly with the patient during this visit

## 2023-01-09 ENCOUNTER — TELEMEDICINE (OUTPATIENT)
Dept: PSYCHIATRY | Facility: CLINIC | Age: 63
End: 2023-01-09

## 2023-01-09 DIAGNOSIS — F31.9 BIPOLAR AFFECTIVE DISORDER, CURRENT EPISODE MODERATE (HCC): ICD-10-CM

## 2023-01-09 DIAGNOSIS — F31.32 BIPOLAR AFFECTIVE DISORDER, CURRENTLY DEPRESSED, MODERATE (HCC): ICD-10-CM

## 2023-01-09 DIAGNOSIS — F41.1 GENERALIZED ANXIETY DISORDER: Primary | ICD-10-CM

## 2023-01-09 DIAGNOSIS — F43.81 PROLONGED GRIEF REACTION: ICD-10-CM

## 2023-01-09 RX ORDER — QUETIAPINE FUMARATE 100 MG/1
TABLET, FILM COATED ORAL
Qty: 90 TABLET | Refills: 1 | Status: SHIPPED | OUTPATIENT
Start: 2023-01-09

## 2023-01-09 RX ORDER — DESVENLAFAXINE 50 MG/1
50 TABLET, EXTENDED RELEASE ORAL DAILY
Qty: 90 TABLET | Refills: 1 | Status: SHIPPED | OUTPATIENT
Start: 2023-01-09

## 2023-01-09 RX ORDER — LAMOTRIGINE 200 MG/1
200 TABLET ORAL
Qty: 90 TABLET | Refills: 1 | Status: SHIPPED | OUTPATIENT
Start: 2023-01-09

## 2023-01-09 RX ORDER — CLONAZEPAM 1 MG/1
TABLET ORAL
Qty: 270 TABLET | Refills: 1 | Status: SHIPPED | OUTPATIENT
Start: 2023-01-09

## 2023-01-09 NOTE — PSYCH
This note was not shared with the patient due to reasonable likelihood of causing patient harm      Virtual Regular Visit    Verification of patient location:    Patient is located in the following state in which I hold an active license [place]      Assessment/Plan:    Problem List Items Addressed This Visit        Other    Bipolar affective disorder, current episode moderate (HCC)    Relevant Medications    QUEtiapine (SEROquel) 100 mg tablet    desvenlafaxine succinate (PRISTIQ) 50 mg 24 hr tablet    clonazePAM (KlonoPIN) 1 mg tablet    Generalized anxiety disorder - Primary    Relevant Medications    QUEtiapine (SEROquel) 100 mg tablet    desvenlafaxine succinate (PRISTIQ) 50 mg 24 hr tablet    clonazePAM (KlonoPIN) 1 mg tablet    lamoTRIgine (LaMICtal) 200 MG tablet   Other Visit Diagnoses     Prolonged grief reaction              Goals addressed in session: Goal 1          Reason for visit is   Chief Complaint   Patient presents with   • Follow-up   • Medication Management   • Virtual Regular Visit        Encounter provider Wyatt Trinidad PA-C    Provider located at [address] [id number]      Recent Visits  No visits were found meeting these conditions  Showing recent visits within past 7 days and meeting all other requirements  Today's Visits  Date Type Provider Dept   01/09/23 [address] [place] [address], [address], [place] [address] today's visits and meeting all other requirements  Future Appointments  No visits were found meeting these conditions  Showing future appointments within next 150 days and meeting all other requirements       The patient was identified by name and date of birth  Betty Haney was informed that this is a telemedicine visit and that the [facility]  She agrees to proceed     My office door was closed   No one else was in the room  She acknowledged consent and understanding of privacy and security of the video platform  The patient has agreed to participate and understands they can discontinue the visit at any time  Patient is aware this is a billable service  Subjective  Asha Porter is a 60 y o  female history of bipolar disorder  HPI   Bella Garcia was seen for follow-up of bipolar disorder and for medication management  She has been feeling more depressed and "not well"  She has little motivation and not doing much  Since her back surgery in September she has not been feeling any better  Also had colonoscopy and had 13 polyps and needs to have further surgery for removal   She has been having poor sleep due to thinking about her spouse who passed away  She is back to taking 100 mg seroquel at hs  states that this has been helpful  Has noted an increase in her appetite which we discussed trying to watch diet  Also discussed the possibility of increasing her glucose or cholesterol  She verbalized understanding  Past Medical History:   Diagnosis Date   • Generalized anxiety disorder 10/29/2014       No past surgical history on file      Current Outpatient Medications   Medication Sig Dispense Refill   • clonazePAM (KlonoPIN) 1 mg tablet 1/2 po bid and 2 po qhs prn 270 tablet 1   • desvenlafaxine succinate (PRISTIQ) 50 mg 24 hr tablet Take 1 tablet (50 mg total) by mouth daily 90 tablet 1   • lamoTRIgine (LaMICtal) 200 MG tablet Take 1 tablet (200 mg total) by mouth daily at bedtime 90 tablet 1   • QUEtiapine (SEROquel) 100 mg tablet TAKE 1 TABLET BY MOUTH EVERYDAY AT BEDTIME 90 tablet 1   • ascorbic acid (VITAMIN C) 500 mg tablet TAKE 1 TABLET BY MOUTH EVERY DAY FOR 50 DAYS  0   • atorvastatin (LIPITOR) 20 mg tablet Take 20 mg by mouth     • celecoxib (CeleBREX) 200 mg capsule TAKE ONE CAPSULE BY MOUTH TWICE A DAY     • Cholecalciferol (VITAMIN D3) 1000 units CAPS TAKE 1 CAPSULE (50,000 UNITS TOTAL) BY MOUTH ONCE A WEEK FOR 12 DOSES  • clopidogrel (PLAVIX) 75 mg tablet Take 4 tablets (300 mg) on day one then take 1 tablet daily after that  Start after brilinta gone  • fenofibrate (FENOGLIDE) 120 MG TABS Take 120 mg by mouth     • fluticasone furoate-vilanterol (BREO ELLIPTA) Inhale 1 puff     • gabapentin (NEURONTIN) 300 mg capsule Take 300 mg by mouth 3 (three) times a day     • LINZESS 145 MCG CAPS 1 (ONE) CAPSULE 30 MIN PRIOR TO MEAL  1   • metoprolol succinate (TOPROL-XL) 25 mg 24 hr tablet Take 25 mg by mouth     • Multiple Vitamin (DAILY VALUE MULTIVITAMIN) TABS Take 1 tablet by mouth     • Omega-3 Fatty Acids (FISH OIL) 1,000 mg Take 1,000 mg by mouth daily     • pantoprazole (PROTONIX) 40 mg tablet Take by mouth     • PROAIR RESPICLICK 511 (90 Base) MCG/ACT AEPB Inhale 2 puffs every 4 (four) hours as needed  3   • Restasis 0 05 % ophthalmic emulsion INSTILL 1 DROP INTO BOTH EYES TWICE A DAY     • traMADol (ULTRAM) 50 mg tablet TAKE 1 TABLET (50 MG TOTAL) BY MOUTH EVERY 6 HOURS AS NEEDED FOR MODERATE PAIN (PAIN SCORE 4-6)       No current facility-administered medications for this visit  Allergies   Allergen Reactions   • Latex Rash       Review of Systems  Knee pain and as noted in HPI  Video Exam    There were no vitals filed for this visit      Physical Exam   Mental STATUS exam:  Speech is clear, coherent, normal rate and volume  Adequate hygiene, good eye contact  Affect is dysthymic, anxious, mood is congruent  Circumstantial thought process  Negative and pessimistic, somatic preoccupations,  No suicidal or homicidal ideation  No psychotic signs or symptoms, no hallucinations and no delusions were voiced by cognition is intact  Insight and judgment is intact    Medications and treatment plan as follows:  Pristiq 50 mg daily  Seroquel 50 mg p o  nightly increased to 100 mg  Lamictal 200 mg p o  daily   clonazepam 1 mg half tab twice daily and 2 at hs  She has deteriorated with attempts at reduction of her Klonopin and is currently functioning at her highest level    SHe is aware of potential for adverse effects with medications including respiratory depression especially taken concomitantly with alcohol or narcotics  We will follow up in 3 to 4 months and she will call me sooner if any questions or concerns    Visit Time    Visit Start Time: 1152  Visit Stop Time: 1218  Total Visit Duration: 26 minutes

## 2023-01-09 NOTE — BH TREATMENT PLAN
TREATMENT PLAN (Medication Management Only)        [facility]    Name and Date of Birth:  Maryann Nguyen 60 y o  1960  Date of Treatment Plan: January 9, 2023  Diagnosis/Diagnoses:    1  Generalized anxiety disorder    2  Bipolar affective disorder, currently depressed, moderate ([address] )    3  Prolonged grief reaction      Strengths/Personal Resources for Self-Care: supportive family  Area/Areas of need (in own words): anxiety symptoms, depressive symptoms  1  Long Term Goal: continue improvement in acceptable anxiety level  Target Date:6 months - 7/9/2023  Person/Persons responsible for completion of goal: Freya  2  Short Term Objective (s) - How will we reach this goal?:   A  Provider new recommended medication/dosage changes and/or continue medication(s): continue current medications as prescribed  B  gym at least 1 time per week with goal of 4 times per week  C  N/A  Target Date:6 months - 7/9/2023  Person/Persons Responsible for Completion of Goal: Freya  Progress Towards Goals: stable  Treatment Modality: medication management every 6 months  Review due 180 days from date of this plan: 6 months - 7/9/2023  Expected length of service: maintenance  My Physician/PA/NP and I have developed this plan together and I agree to work on the goals and objectives  I understand the treatment goals that were developed for my treatment

## 2023-05-06 DIAGNOSIS — F41.1 GENERALIZED ANXIETY DISORDER: ICD-10-CM

## 2023-05-06 DIAGNOSIS — F31.32 BIPOLAR AFFECTIVE DISORDER, CURRENTLY DEPRESSED, MODERATE (HCC): ICD-10-CM

## 2023-05-07 RX ORDER — LAMOTRIGINE 200 MG/1
200 TABLET ORAL
Qty: 90 TABLET | Refills: 1 | Status: SHIPPED | OUTPATIENT
Start: 2023-05-07

## 2023-05-07 RX ORDER — DESVENLAFAXINE SUCCINATE 50 MG/1
TABLET, EXTENDED RELEASE ORAL
Qty: 90 TABLET | Refills: 1 | Status: SHIPPED | OUTPATIENT
Start: 2023-05-07

## 2023-07-10 ENCOUNTER — OFFICE VISIT (OUTPATIENT)
Dept: PSYCHIATRY | Facility: CLINIC | Age: 63
End: 2023-07-10
Payer: COMMERCIAL

## 2023-07-10 DIAGNOSIS — F31.32 BIPOLAR AFFECTIVE DISORDER, CURRENTLY DEPRESSED, MODERATE (HCC): Primary | ICD-10-CM

## 2023-07-10 DIAGNOSIS — F41.1 GENERALIZED ANXIETY DISORDER: ICD-10-CM

## 2023-07-10 PROCEDURE — 99214 OFFICE O/P EST MOD 30 MIN: CPT | Performed by: PHYSICIAN ASSISTANT

## 2023-07-10 RX ORDER — APIXABAN 5 MG/1
5 TABLET, FILM COATED ORAL 2 TIMES DAILY
COMMUNITY
Start: 2023-07-07

## 2023-07-10 RX ORDER — CLONAZEPAM 1 MG/1
TABLET ORAL
Qty: 270 TABLET | Refills: 0 | Status: SHIPPED | OUTPATIENT
Start: 2023-07-10

## 2023-07-10 RX ORDER — QUETIAPINE FUMARATE 50 MG/1
TABLET, FILM COATED ORAL
Qty: 180 TABLET | Refills: 1 | Status: SHIPPED | OUTPATIENT
Start: 2023-07-10

## 2023-07-10 NOTE — PSYCH
PROGRESS NOTE        [facility]      Name and Date of Birth:  Tray Barrera 58 y.o. 1960    Date of Visit: 07/10/23    SUBJECTIVE:  Delia Casanova was seen for follow-up of bipolar disorder and for medication management. She has had multiple medical appointments over the past few months. Continues to follow with heme-onc for lung adenocarcinoma. Was hospitalized in April for A-fib. Has been following with cardiology and recently had a stress test.  States that her sleep has improved. Appetite is adequate. Notes an increase in appetite when she takes 100 mg of Seroquel versus 50. States that some nights though she sleeps better with the 100 mg. We will change prescription to 50 mg 1-2 at bedtime. Good relationship with her family. 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 Taking Pristiq 50 mg daily, Lamictal 2 mg daily clonazepam as needed. Has deteriorated with further attempts at reduction in clonazepam and currently appears at her baseline. She denies suicidal ideation, intent or plan at present, has no suicidal ideation, intent or plan at present. She denies any auditory hallucinations and visual hallucinations, denies any other delusional thinking, denies any delusional thinking. She denies any side effects from medications  . HPI ROS Appetite Changes and Sleep: normal appetite, fair sleep  Review Of Systems:      Constitutional Negative   ENT Negative   Cardiovascular Negative   Respiratory ALONZO   Gastrointestinal Negative   Genitourinary Negative   Musculoskeletal Negative   Integumentary Negative   Neurological Negative   Endocrine Negative   Other Symptoms Negative and None       Laboratory Results: No results found for this or any previous visit. Substance Abuse History:    Social History     Substance and Sexual Activity   Drug Use No       Family Psychiatric History:     No family history on file.     The following portions of the patient's history were reviewed and updated as appropriate: past family history, past medical history, past social history, past surgical history and problem list.    Social History     Socioeconomic History   • Marital status:       Spouse name: Not on file   • Number of children: Not on file   • Years of education: Not on file   • Highest education level: Not on file   Occupational History   • Not on file   Tobacco Use   • Smoking status: Every Day     Packs/day: 1.50     Types: Cigarettes   • Smokeless tobacco: Never   Substance and Sexual Activity   • Alcohol use: No   • Drug use: No   • Sexual activity: Not on file   Other Topics Concern   • Not on file   Social History Narrative   • Not on file     Social Determinants of Health     Financial Resource Strain: Not on file   Food Insecurity: Not on file   Transportation Needs: Not on file   Physical Activity: Not on file   Stress: Not on file   Social Connections: Not on file   Intimate Partner Violence: Not on file   Housing Stability: Not on file     Social History     Social History Narrative   • Not on file        Social History     Tobacco History     Smoking Status  Every Day Smoking Frequency  1.50 packs/day Smoking Tobacco Type  Cigarettes    Smokeless Tobacco Use  Never          Alcohol History     Alcohol Use Status  No          Drug Use     Drug Use Status  No          Sexual Activity     Sexually Active  Not Asked          Activities of Daily Living    Not Asked                     OBJECTIVE:     Mental Status Evaluation:    Appearance age appropriate, casually dressed   Behavior  currently calm, cooperative   Speech normal volume, normal pitch   Mood  anxious   Affect  congruent   Thought Processes logical   Associations intact associations   Thought Content normal   Perceptual Disturbances: none   Abnormal Thoughts  Risk Potential Suicidal ideation - None  Homicidal ideation - None  Potential for aggression - No Orientation oriented to person, place, time/date and situation   Memory recent and remote memory grossly intact   Cosciousness alert and awake   Attention Span attention span and concentration are fair   Intellect  not formally assessed   Insight age appropriate    Judgement good    Muscle Strength and  Gait  steady gait   Language no difficulty naming common objects   Fund of Knowledge displays adequate knowledge of current events   Pain none   Pain Scale 0       Assessment/Plan:       Diagnoses and all orders for this visit:    Bipolar affective disorder, currently depressed, moderate (HCC)  -     clonazePAM (KlonoPIN) 1 mg tablet; 1/2 po bid and 2 po qhs prn    Generalized anxiety disorder  -     QUEtiapine (SEROquel) 50 mg tablet; TAKE 2 TABLET BY MOUTH EVERYDAY AT BEDTIME  -     clonazePAM (KlonoPIN) 1 mg tablet; 1/2 po bid and 2 po qhs prn    Other orders  -     Eliquis 5 MG; 5 mg 2 (two) times a day          Treatment Recommendations/Precautions:  Pristiq 50 mg daily  Seroquel 50 mg at bedtime  Lamictal 200 mg daily and   clonazepam 1 mg half twice daily and 2 at bedtime    Continue current medications and follow up in 3 months, she will call me sooner if any questions or concerns    Risks/Benefits      Risks, Benefits And Possible Side Effects Of Medications:    Risks, benefits, and possible side effects of medications explained to patient and patient verbalizes understanding and agreement for treatment.     Controlled Medication Discussion:     PDMP reviewed taking as prescribed and aware not to take concomitantly with any narcotics or alcohol    Psychotherapy Provided:     Individual psychotherapy provided: No

## 2023-08-28 ENCOUNTER — TELEPHONE (OUTPATIENT)
Dept: PSYCHIATRY | Facility: CLINIC | Age: 63
End: 2023-08-28

## 2023-08-28 NOTE — TELEPHONE ENCOUNTER
Claudia Fu returned my call. Informed her that she should be taking one 200 MG tablet of Lamictal daily at bedtime.

## 2023-08-28 NOTE — TELEPHONE ENCOUNTER
LM on Freya's VM informing her that I was returning her phone call. Requested she call me back for further discussion.

## 2023-10-23 ENCOUNTER — OFFICE VISIT (OUTPATIENT)
Dept: PSYCHIATRY | Facility: CLINIC | Age: 63
End: 2023-10-23
Payer: COMMERCIAL

## 2023-10-23 DIAGNOSIS — F41.1 GENERALIZED ANXIETY DISORDER: ICD-10-CM

## 2023-10-23 DIAGNOSIS — F31.32 BIPOLAR AFFECTIVE DISORDER, CURRENTLY DEPRESSED, MODERATE (HCC): Primary | ICD-10-CM

## 2023-10-23 PROCEDURE — 99214 OFFICE O/P EST MOD 30 MIN: CPT | Performed by: PHYSICIAN ASSISTANT

## 2023-10-23 RX ORDER — CLONAZEPAM 1 MG/1
TABLET ORAL
Qty: 270 TABLET | Refills: 0 | Status: SHIPPED | OUTPATIENT
Start: 2023-10-23

## 2023-10-23 RX ORDER — UMECLIDINIUM BROMIDE AND VILANTEROL TRIFENATATE 62.5; 25 UG/1; UG/1
POWDER RESPIRATORY (INHALATION)
COMMUNITY
Start: 2023-10-03

## 2023-10-23 RX ORDER — LAMOTRIGINE 200 MG/1
200 TABLET ORAL
Qty: 90 TABLET | Refills: 1 | Status: SHIPPED | OUTPATIENT
Start: 2023-10-23

## 2023-10-23 RX ORDER — LUMATEPERONE 10.5 MG/1
10.5 CAPSULE ORAL EVERY EVENING
Qty: 15 CAPSULE | Refills: 0 | Status: SHIPPED | OUTPATIENT
Start: 2023-10-23 | End: 2023-10-30 | Stop reason: SDUPTHER

## 2023-10-23 NOTE — PSYCH
This note was not shared with the patient due to patient requested    Start 0925 end [address] [facility]      Name and Date of Birth:  David Padgett 58 y.o. 1960    Date of Visit: 10/23/23    SUBJECTIVE:  Vikki Edward was seen for follow-up of bipolar disorder, anxiety and for medication management. States that she has not been feeling well since her last visit. Continues with high anxiety and feels sad, depressed. States that she is not having any suicidal or homicidal ideation. Also states that she has not been sleeping well for the past month. States that she has been taking her nighttime medications around 10 PM but is frequently awake until about 2 AM.  States that she feels very tired then during the day. Feels tired due to physical symptoms as well. Following with pulmonology, cardiology on a regular basis. States that she has been frustrated with some other providers due to misrepresentations of charting. Medication list reviewed and updated. Has been taking medication as prescribed. Feels as though the Pristiq is causing her adverse effects including headaches. States that her son also had similar side effects with Pristiq recently. She denies suicidal ideation, intent or plan at present, has no suicidal ideation, intent or plan at present. She denies any auditory hallucinations and visual hallucinations, denies any other delusional thinking, denies any delusional thinking. She denies any side effects from medications  .   HPI ROS Appetite Changes and Sleep: normal appetite, normal sleep    Review Of Systems:      Constitutional Negative   ENT Negative   Cardiovascular Negative   Respiratory Negative   Gastrointestinal Negative   Genitourinary Negative   Musculoskeletal Negative   Integumentary Negative   Neurological Negative   Endocrine Negative   Other Symptoms Negative and None       Laboratory Results: No results found for this or any previous visit. Substance Abuse History:    Social History     Substance and Sexual Activity   Drug Use No       Family Psychiatric History:     No family history on file. The following portions of the patient's history were reviewed and updated as appropriate: past family history, past medical history, past social history, past surgical history and problem list.    Social History     Socioeconomic History    Marital status:       Spouse name: Not on file    Number of children: Not on file    Years of education: Not on file    Highest education level: Not on file   Occupational History    Not on file   Tobacco Use    Smoking status: Every Day     Packs/day: 1.50     Types: Cigarettes    Smokeless tobacco: Never   Substance and Sexual Activity    Alcohol use: No    Drug use: No    Sexual activity: Not on file   Other Topics Concern    Not on file   Social History Narrative    Not on file     Social Determinants of Health     Financial Resource Strain: Not on file   Food Insecurity: Not on file   Transportation Needs: Not on file   Physical Activity: Not on file   Stress: Not on file   Social Connections: Not on file   Intimate Partner Violence: Not on file   Housing Stability: Not on file     Social History     Social History Narrative    Not on file        Social History       Tobacco History       Smoking Status  Every Day Smoking Frequency  1.50 packs/day Smoking Tobacco Type  Cigarettes      Smokeless Tobacco Use  Never              Alcohol History       Alcohol Use Status  No              Drug Use       Drug Use Status  No              Sexual Activity       Sexually Active  Not Asked              Activities of Daily Living    Not Asked                       OBJECTIVE:     Mental Status Evaluation:    Appearance age appropriate, casually dressed   Behavior Calm, cooperative   Speech normal volume, normal pitch   Mood Anxious, depressed   Affect Congruent   Thought Processes Circumstantial   Associations intact associations   Thought Content Somatic   Perceptual Disturbances: none   Abnormal Thoughts  Risk Potential Suicidal ideation - None  Homicidal ideation - None  Potential for aggression - No   Orientation oriented to person, place, time/date and situation   Memory recent and remote memory grossly intact   Cosciousness alert and awake   Attention Span attention span and concentration are decreased, easily distractible   Intellect Not formally assessed   Insight age appropriate    Judgement good    Muscle Strength and  Gait muscle strength and tone were normal   Language no difficulty naming common objects   Fund of Knowledge displays adequate knowledge of current events   Pain none   Pain Scale 0       Assessment/Plan:       Diagnoses and all orders for this visit:    Bipolar affective disorder, currently depressed, moderate (HCC)  -     Lumateperone Tosylate (Caplyta) 10.5 MG CAPS; Take 10.5 mg by mouth every evening  -     clonazePAM (KlonoPIN) 1 mg tablet; 1/2 po bid and 2 po qhs prn    Generalized anxiety disorder  -     clonazePAM (KlonoPIN) 1 mg tablet; 1/2 po bid and 2 po qhs prn  -     lamoTRIgine (LaMICtal) 200 MG tablet;  Take 1 tablet (200 mg total) by mouth daily at bedtime    Other orders  -     Anoro Ellipta 62.5-25 MCG/ACT inhaler          Treatment Recommendations/Precautions:  Discontinue Pristiq 50 mg due to adverse effect and minimal benefit    Discontinue Seroquel 50 mg at bedtime    Start Caplyta 10.5 mg every evening  She will call me in 1 week with an update and possible titration to 21 mg    Continue with Lamictal and Klonopin as ordered  We will follow-up in 6 to 8 weeks and she will come in sooner if any questions or concerns    Risks/Benefits      Risks, Benefits And Possible Side Effects Of Medications:    Risks, benefits, and possible side effects of medications explained to patient and patient verbalizes understanding and agreement for treatment.     Controlled Medication Discussion:     Not applicable    Psychotherapy Provided:     Individual psychotherapy provided: No

## 2023-10-23 NOTE — BH TREATMENT PLAN
TREATMENT PLAN (Medication Management Only)        [facility]    Name and Date of Birth:  Richie John 58 y.o. 1960  Date of Treatment Plan: October 23, 2023  Diagnosis/Diagnoses:    1. Bipolar affective disorder, currently depressed, moderate ([address])    2. Generalized anxiety disorder      Strengths/Personal Resources for Self-Care: supportive family, taking medications as prescribed. Area/Areas of need (in own words): anxiety symptoms, depressive symptoms  1. Long Term Goal: improve mood stability. Target Date:2 months - 12/23/2023  Person/Persons responsible for completion of goal: Freya  2. Short Term Objective (s) - How will we reach this goal?:   A. Provider new recommended medication/dosage changes and/or continue medication(s):  Discontinue Pristiq and Seroquel, add Caplyta . B. N/A.  C. N/A. Target Date:2 months - 12/23/2023  Person/Persons Responsible for Completion of Goal: Freya  Progress Towards Goals: stable  Treatment Modality: medication management every 3 months  Review due 180 days from date of this plan: 6 months - 4/23/2024  Expected length of service: ongoing treatment  My Physician/PA/NP and I have developed this plan together and I agree to work on the goals and objectives. I understand the treatment goals that were developed for my treatment.

## 2023-10-30 DIAGNOSIS — F31.32 BIPOLAR AFFECTIVE DISORDER, CURRENTLY DEPRESSED, MODERATE (HCC): ICD-10-CM

## 2023-10-30 RX ORDER — LUMATEPERONE 10.5 MG/1
10.5 CAPSULE ORAL EVERY EVENING
Qty: 30 CAPSULE | Refills: 2 | Status: SHIPPED | OUTPATIENT
Start: 2023-10-30

## 2023-11-02 ENCOUNTER — TELEPHONE (OUTPATIENT)
Dept: PSYCHIATRY | Facility: CLINIC | Age: 63
End: 2023-11-02

## 2023-11-02 NOTE — TELEPHONE ENCOUNTER
Adan Hernandez was recently prescribed caplyta , and was able to get 15 days free with a savings card. But now that she's due for a refill she cannot use the savings cards continuously due to having medicare per pharmacist . The medicine seems to be working well, but chinmay is afraid of not being able to continue it due the cost monthly.  It will cost around $60 for her but she's unable to afford it, and called to see if we have any suggestions,

## 2023-11-07 NOTE — TELEPHONE ENCOUNTER
Spoke with pharmaceutical rep regarding patient's concerns of cost.  At this time, the co-pay the patient has been quoted is which she would need to pay if she is to remain on the medication. We can get her in sooner with her provider to discuss a possible change in the medication which can be more affordable.

## 2023-11-07 NOTE — TELEPHONE ENCOUNTER
Followed up with Claudia Fu today, explained unfortunately the cost cannot be lowered other than what it currently is for $60. Claudia Fu is unable to schedule a sooner appt for November due to other appts scheduled. So she said she can wait to see nathan in December. But if nathan has any suggestions  or alternative she can follow up with chinmay upon her return next week.

## 2023-11-15 ENCOUNTER — TELEPHONE (OUTPATIENT)
Dept: PSYCHIATRY | Facility: CLINIC | Age: 63
End: 2023-11-15

## 2023-11-15 DIAGNOSIS — F31.32 BIPOLAR AFFECTIVE DISORDER, CURRENTLY DEPRESSED, MODERATE (HCC): Primary | ICD-10-CM

## 2023-11-15 NOTE — TELEPHONE ENCOUNTER
GRAZYNA on Freya's VM with [facility] number for call back. Informed her that script for Princess Allen was sent to pharmacy. Also GRAZYNA stating that she can get a discount coupon where she can get the first two 30 day supplies for free. Requested she call me back for further review.

## 2023-11-15 NOTE — TELEPHONE ENCOUNTER
Its been about a week now, chinmay has been without the caplyta for her depression. She's unable to afford it monthly for $60. And pristiq has given her headaches in the past. She's hoping an alternative can be prescribed that maybe a little more affordable for her. She aware that the practice has no control over the cost of the medications, but she can only afford about $30 monthly for anything.

## 2023-12-06 ENCOUNTER — OFFICE VISIT (OUTPATIENT)
Dept: PSYCHIATRY | Facility: CLINIC | Age: 63
End: 2023-12-06
Payer: COMMERCIAL

## 2023-12-06 DIAGNOSIS — F31.32 BIPOLAR AFFECTIVE DISORDER, CURRENTLY DEPRESSED, MODERATE (HCC): Primary | ICD-10-CM

## 2023-12-06 DIAGNOSIS — F41.1 GENERALIZED ANXIETY DISORDER: ICD-10-CM

## 2023-12-06 PROCEDURE — 99214 OFFICE O/P EST MOD 30 MIN: CPT | Performed by: PHYSICIAN ASSISTANT

## 2023-12-06 RX ORDER — CLONAZEPAM 1 MG/1
TABLET ORAL
Qty: 270 TABLET | Refills: 0 | Status: SHIPPED | OUTPATIENT
Start: 2023-12-06

## 2023-12-06 NOTE — PSYCH
This note was not shared with the patient due to reasonable likelihood of causing patient harm      PROGRESS NOTE        [facility]      Name and Date of Birth:  Mary Tai 58 y.o. 1960    Date of Visit: 12/06/23    SUBJECTIVE:  Asher Goodman was seen for follow-up of bipolar disorder, anxiety and for medication management. At her last visit she was started on Gamble Luke however this was not started due to cost.  Prescription for Zelphia Peeks was called in which she started taking 1.5 mg almost 2 weeks ago. She has not noted much of an improvement. Is not having any side effects though with the Vraylar. Has not noted any increased depression and being off of Pristiq. Sleep and appetite are the same. States that she physically feels weak. She appears slightly less depressed today on exam.  No suicidal or homicidal ideation. No psychotic signs or symptoms. Medication list reviewed and updated. Following with pulmonology for lung CA. She denies suicidal ideation, intent or plan at present, has no suicidal ideation, intent or plan at present. She denies any auditory hallucinations and visual hallucinations, denies any other delusional thinking, denies any delusional thinking. She denies any side effects from medications  . HPI ROS Appetite Changes and Sleep: normal appetite, normal sleep    Review Of Systems:      Constitutional Negative   ENT Negative   Cardiovascular Negative   Respiratory Negative   Gastrointestinal Negative   Genitourinary Negative   Musculoskeletal Negative   Integumentary Negative   Neurological Negative   Endocrine Negative   Other Symptoms Negative and None       Laboratory Results: No results found for this or any previous visit. Substance Abuse History:    Social History     Substance and Sexual Activity   Drug Use No       Family Psychiatric History:     No family history on file.     The following portions of the patient's history were reviewed and updated as appropriate: past family history, past medical history, past social history, past surgical history and problem list.    Social History     Socioeconomic History    Marital status:       Spouse name: Not on file    Number of children: Not on file    Years of education: Not on file    Highest education level: Not on file   Occupational History    Not on file   Tobacco Use    Smoking status: Every Day     Packs/day: 1.50     Types: Cigarettes    Smokeless tobacco: Never   Substance and Sexual Activity    Alcohol use: No    Drug use: No    Sexual activity: Not on file   Other Topics Concern    Not on file   Social History Narrative    Not on file     Social Determinants of Health     Financial Resource Strain: Not on file   Food Insecurity: Not on file   Transportation Needs: Not on file   Physical Activity: Not on file   Stress: Not on file   Social Connections: Not on file   Intimate Partner Violence: Not on file   Housing Stability: Not on file     Social History     Social History Narrative    Not on file        Social History       Tobacco History       Smoking Status  Every Day Smoking Frequency  1.50 packs/day Smoking Tobacco Type  Cigarettes      Smokeless Tobacco Use  Never              Alcohol History       Alcohol Use Status  No              Drug Use       Drug Use Status  No              Sexual Activity       Sexually Active  Not Asked              Activities of Daily Living    Not Asked                       OBJECTIVE:     Mental Status Evaluation:    Appearance age appropriate, casually dressed   Behavior Calm, cooperative   Speech normal volume, normal pitch   Mood Dysthymic, anxious   Affect Congruent   Thought Processes logical   Associations intact associations   Thought Content normal   Perceptual Disturbances: none   Abnormal Thoughts  Risk Potential Suicidal ideation - None  Homicidal ideation - None  Potential for aggression - No   Orientation oriented to person, place, time/date and situation   Memory recent and remote memory grossly intact   Cosciousness alert and awake   Attention Span attention span and concentration are age appropriate   Intellect Not formally assessed   Insight age appropriate    Judgement good    Muscle Strength and  Gait muscle strength and tone were normal   Language no difficulty naming common objects   Fund of Knowledge displays adequate knowledge of current events   Pain none   Pain Scale 0       Assessment/Plan:       Diagnoses and all orders for this visit:    Bipolar affective disorder, currently depressed, moderate (HCC)  -     clonazePAM (KlonoPIN) 1 mg tablet; 1/2 po bid and 2 po qhs prn    Generalized anxiety disorder  -     clonazePAM (KlonoPIN) 1 mg tablet; 1/2 po bid and 2 po qhs prn          Treatment Recommendations/Precautions:  Vraylar 1.5 mg daily  Lamictal 200 mg daily  Clonazepam 1 mg one half twice daily and 2 at bedtime as needed    Continue current medications:    Risks/Benefits      Risks, Benefits And Possible Side Effects Of Medications:    Risks, benefits, and possible side effects of medications explained to patient and patient verbalizes understanding and agreement for treatment.     Controlled Medication Discussion:     PDMP reviewed    Psychotherapy Provided:     Individual psychotherapy provided: No

## 2023-12-27 ENCOUNTER — TELEPHONE (OUTPATIENT)
Dept: PSYCHIATRY | Facility: CLINIC | Age: 63
End: 2023-12-27

## 2023-12-27 DIAGNOSIS — F51.04 PSYCHOPHYSIOLOGICAL INSOMNIA: Primary | ICD-10-CM

## 2023-12-27 RX ORDER — TRAZODONE HYDROCHLORIDE 50 MG/1
50 TABLET ORAL
Qty: 30 TABLET | Refills: 1 | Status: SHIPPED | OUTPATIENT
Start: 2023-12-27

## 2023-12-27 NOTE — TELEPHONE ENCOUNTER
LM on Freya's VM requesting she call me back to discuss medications options. (Trazodone or Hydroxyzine).  Nursing number provided for call back.

## 2023-12-27 NOTE — TELEPHONE ENCOUNTER
Freya called due to having difficulty sleeping for the last couple of weeks.  States that she was taken off Seroquel and she would like to know if Silvana can prescribe that again to help her sleep.    Will refer to Celia Lambert for review.

## 2023-12-27 NOTE — TELEPHONE ENCOUNTER
Freya returned my call.  She has tried Benadryl in the past and it had the opposite effect on her.  Therefore, she would like to try the Trazodone.      Will refer to Celia Lambert to review for script.

## 2024-01-19 DIAGNOSIS — F51.04 PSYCHOPHYSIOLOGICAL INSOMNIA: ICD-10-CM

## 2024-01-19 RX ORDER — TRAZODONE HYDROCHLORIDE 50 MG/1
50 TABLET ORAL
Qty: 90 TABLET | Refills: 1 | Status: SHIPPED | OUTPATIENT
Start: 2024-01-19

## 2024-02-08 ENCOUNTER — TELEMEDICINE (OUTPATIENT)
Dept: PSYCHIATRY | Facility: CLINIC | Age: 64
End: 2024-02-08
Payer: COMMERCIAL

## 2024-02-08 DIAGNOSIS — F31.32 BIPOLAR AFFECTIVE DISORDER, CURRENTLY DEPRESSED, MODERATE (HCC): Primary | ICD-10-CM

## 2024-02-08 DIAGNOSIS — F41.1 GENERALIZED ANXIETY DISORDER: ICD-10-CM

## 2024-02-08 DIAGNOSIS — F51.04 PSYCHOPHYSIOLOGICAL INSOMNIA: ICD-10-CM

## 2024-02-08 PROCEDURE — 99214 OFFICE O/P EST MOD 30 MIN: CPT | Performed by: PHYSICIAN ASSISTANT

## 2024-02-08 RX ORDER — CLONAZEPAM 1 MG/1
TABLET ORAL
Qty: 270 TABLET | Refills: 0 | Status: SHIPPED | OUTPATIENT
Start: 2024-02-08

## 2024-02-08 RX ORDER — TRAZODONE HYDROCHLORIDE 50 MG/1
50 TABLET ORAL
Qty: 180 TABLET | Refills: 1 | Status: SHIPPED | OUTPATIENT
Start: 2024-02-08

## 2024-02-08 NOTE — PSYCH
"This note was not shared with the patient due to reasonable likelihood of causing patient harm      PROGRESS NOTE        [facility]      Name and Date of Birth:  Freya Leon 63 y.o. 1960    Date of Visit: 02/08/24    SUBJECTIVE:  Freya was seen for follow-up of bipolar disorder, anxiety and for medication management.    States that she is \"horrible\".  She had recent PET scan and cancer has spread.  Also showed calcification of arteries.  She has a follow-up appointment cardiology tomorrow and has concerns about this additional finding.  She states that she also has a PET scan scheduled for today.  She is following with oncology as scheduled.  Has been struggling with on some knee.  She had called in December and trazodone was started.  States that when she started the trazodone she stopped the Vraylar.  She has been feeling increasingly sad, depressed, anxious.  Appetite is fair.  Discussed that she should continue on with the Vraylar as this is for her mood and not for sleep.  She verbalized understanding and agreement with retrial of Vraylar.        She denies suicidal ideation, intent or plan at present, has no suicidal ideation, intent or plan at present.    She denies any auditory hallucinations and visual hallucinations, denies any other delusional thinking, denies any delusional thinking.    She denies any side effects from medications  .  HPI ROS Appetite Changes and Sleep: normal appetite, decreased sleep    Review Of Systems:      Constitutional Negative   ENT Negative   Cardiovascular Negative   Respiratory Negative   Gastrointestinal Negative   Genitourinary Negative   Musculoskeletal Negative   Integumentary Negative   Neurological Negative   Endocrine Negative   Other Symptoms Negative and None       Laboratory Results: No results found for this or any previous visit.    Substance Abuse History:    Social History     Substance and Sexual " Activity   Drug Use No       Family Psychiatric History:     No family history on file.    The following portions of the patient's history were reviewed and updated as appropriate: past family history, past medical history, past social history, past surgical history and problem list.    Social History     Socioeconomic History    Marital status:      Spouse name: Not on file    Number of children: Not on file    Years of education: Not on file    Highest education level: Not on file   Occupational History    Not on file   Tobacco Use    Smoking status: Every Day     Current packs/day: 1.50     Types: Cigarettes    Smokeless tobacco: Never   Substance and Sexual Activity    Alcohol use: No    Drug use: No    Sexual activity: Not on file   Other Topics Concern    Not on file   Social History Narrative    Not on file     Social Determinants of Health     Financial Resource Strain: Low Risk  (4/13/2023)    Received from [facility]    Overall Financial Resource Strain (CARDIA)     Difficulty of Paying Living Expenses: Not very hard   Food Insecurity: No Food Insecurity (4/13/2023)    Received from [facility]    Hunger Vital Sign     Worried About Running Out of Food in the Last Year: Never true     Ran Out of Food in the Last Year: Never true   Transportation Needs: No Transportation Needs (4/13/2023)    Received from [facility]    PRAPARE - Transportation     Lack of Transportation (Medical): No     Lack of Transportation (Non-Medical): No   Physical Activity: Not on file   Stress: Not on file   Social Connections: Not on file   Intimate Partner Violence: Not At Risk (4/13/2023)    Received from [facility]    Humiliation, Afraid, Rape, and Kick questionnaire     Fear of Current or Ex-Partner: No     Emotionally Abused: No     Physically Abused: No     Sexually Abused: No   Housing Stability: Low Risk  (4/13/2023)    Received from [facility]    Housing Stability Vital Sign     Unable to Pay for Housing in the Last Year: No     Number of Places Lived in the Last Year: 1     Unstable Housing in the Last Year: No     Social History     Social History Narrative    Not on file        Social History       Tobacco History       Smoking Status  Every Day Current Packs/Day  1.5 packs/day Smoking Tobacco Type  Cigarettes   Pack Year History     Packs/Day From To Years    1.5   0.0      Smokeless Tobacco Use  Never              Alcohol History       Alcohol Use Status  No              Drug Use       Drug Use Status  No              Sexual Activity       Sexually Active  Not Asked              Activities of Daily Living    Not Asked                       OBJECTIVE:     Mental Status Evaluation:    Appearance age appropriate, casually dressed   Behavior Calm, cooperative   Speech normal volume, normal pitch   Mood Anxious, depressed   Affect Congruent   Thought Processes logical   Associations intact associations   Thought Content normal   Perceptual Disturbances: none   Abnormal Thoughts  Risk Potential Suicidal ideation - None  Homicidal ideation - None  Potential for aggression - No   Orientation oriented to person, place, time/date and situation   Memory recent and remote memory grossly intact   Cosciousness alert and awake   Attention Span attention span and concentration are age appropriate   Intellect Not formally assessed   Insight age appropriate    Judgement good    Muscle Strength and  Gait Not observed, video appt   Language no difficulty naming common objects   Fund of Knowledge displays adequate knowledge of current events   Pain none   Pain Scale 0       Assessment/Plan:       Diagnoses and all orders for this visit:    Bipolar affective disorder, currently depressed, moderate (HCC)  -     cariprazine (Vraylar) 1.5 MG capsule; Take 1 capsule (1.5 mg total) by mouth every morning  -     clonazePAM (KlonoPIN) 1 mg tablet; 1/2 po  bid and 2 po qhs prn    Psychophysiological insomnia  -     traZODone (DESYREL) 50 mg tablet; Take 1 tablet (50 mg total) by mouth daily at bedtime as needed for sleep    Generalized anxiety disorder  -     clonazePAM (KlonoPIN) 1 mg tablet; 1/2 po bid and 2 po qhs prn          Treatment Recommendations/Precautions:  Restart Vraylar 1.5 mg daily    Trazodone 50 mg 1-2 po qhs prn    Lamictal 200 mg daily    Clonazepam 1 mg half tab twice a day and 2 at bedtime as needed    Follow up 3 months and prn    Risks/Benefits      Risks, Benefits And Possible Side Effects Of Medications:    Risks, benefits, and possible side effects of medications explained to patient and patient verbalizes understanding and agreement for treatment.    Controlled Medication Discussion:     PDMP reviewed    Psychotherapy Provided:     Individual psychotherapy provided: No    Virtual Regular Visit    Verification of patient location:    Patient is located at Home in the following state in which I hold an active license [place]      Assessment/Plan:    Problem List Items Addressed This Visit          Other    Bipolar affective disorder, current episode moderate (HCC) - Primary    Relevant Medications    cariprazine (Vraylar) 1.5 MG capsule    traZODone (DESYREL) 50 mg tablet    clonazePAM (KlonoPIN) 1 mg tablet    Generalized anxiety disorder    Relevant Medications    cariprazine (Vraylar) 1.5 MG capsule    traZODone (DESYREL) 50 mg tablet    clonazePAM (KlonoPIN) 1 mg tablet     Other Visit Diagnoses       Psychophysiological insomnia        Relevant Medications    cariprazine (Vraylar) 1.5 MG capsule    traZODone (DESYREL) 50 mg tablet            Goals addressed in session: Goal 1          Reason for visit is   Chief Complaint   Patient presents with    Medication Management    Follow-up    Virtual Regular Visit          Encounter provider Celia Lambert PA-C    Provider located at [facility]  [place]  [address]  [place] [address]      Recent Visits  No visits were found meeting these conditions.  Showing recent visits within past 7 days and meeting all other requirements  Today's Visits  Date Type Provider Dept   02/08/24 Telemedicine Celia Lambert PA-C [facility]   Showing today's visits and meeting all other requirements  Future Appointments  No visits were found meeting these conditions.  Showing future appointments within next 150 days and meeting all other requirements       The patient was identified by name and date of birth. Freya Leon was informed that this is a telemedicine visit and that the visit is being conducted throughthe Epic Embedded platform. She agrees to proceed..  My office door was closed. No one else was in the room.  She acknowledged consent and understanding of privacy and security of the video platform. The patient has agreed to participate and understands they can discontinue the visit at any time.    Patient is aware this is a billable service.         Past Medical History:   Diagnosis Date    Generalized anxiety disorder 10/29/2014       Past Surgical History:   Procedure Laterality Date    CT NEEDLE BIOPSY LUNG  2/8/2023       Current Outpatient Medications   Medication Sig Dispense Refill    cariprazine (Vraylar) 1.5 MG capsule Take 1 capsule (1.5 mg total) by mouth every morning 30 capsule 2    clonazePAM (KlonoPIN) 1 mg tablet 1/2 po bid and 2 po qhs prn 270 tablet 0    traZODone (DESYREL) 50 mg tablet Take 1 tablet (50 mg total) by mouth daily at bedtime as needed for sleep 180 tablet 1    Anoro Ellipta 62.5-25 MCG/ACT inhaler       atorvastatin (LIPITOR) 20 mg tablet Take 20 mg by mouth      celecoxib (CeleBREX) 200 mg capsule TAKE ONE CAPSULE BY MOUTH TWICE A DAY      Cholecalciferol (VITAMIN D3) 1000 units CAPS TAKE 1 CAPSULE (50,000 UNITS TOTAL) BY MOUTH ONCE A WEEK FOR 12 DOSES.      Eliquis 5 MG 5 mg 2 (two) times a  day      fenofibrate (FENOGLIDE) 120 MG TABS Take 120 mg by mouth      lamoTRIgine (LaMICtal) 200 MG tablet Take 1 tablet (200 mg total) by mouth daily at bedtime 90 tablet 1    LINZESS 145 MCG CAPS 1 (ONE) CAPSULE 30 MIN PRIOR TO MEAL  1    metoprolol succinate (TOPROL-XL) 25 mg 24 hr tablet Take 25 mg by mouth      pantoprazole (PROTONIX) 40 mg tablet Take by mouth      PROAIR RESPICLICK 108 (90 Base) MCG/ACT AEPB Inhale 2 puffs every 4 (four) hours as needed  3    Restasis 0.05 % ophthalmic emulsion INSTILL 1 DROP INTO BOTH EYES TWICE A DAY       No current facility-administered medications for this visit.     She will follow up in 1 to 2 months and call me sooner if any questions or concerns  Requested staff call her to schedule follow-up    Visit Time    Visit Start Time: 1031  Visit Stop Time: 1100  Total Visit Duration:  29 minutes

## 2024-03-14 ENCOUNTER — OFFICE VISIT (OUTPATIENT)
Dept: PSYCHIATRY | Facility: CLINIC | Age: 64
End: 2024-03-14
Payer: COMMERCIAL

## 2024-03-14 DIAGNOSIS — F51.04 PSYCHOPHYSIOLOGICAL INSOMNIA: ICD-10-CM

## 2024-03-14 DIAGNOSIS — F31.32 BIPOLAR AFFECTIVE DISORDER, CURRENTLY DEPRESSED, MODERATE (HCC): Primary | ICD-10-CM

## 2024-03-14 DIAGNOSIS — F41.1 GENERALIZED ANXIETY DISORDER: ICD-10-CM

## 2024-03-14 DIAGNOSIS — F43.29 PROLONGED GRIEF REACTION: ICD-10-CM

## 2024-03-14 PROCEDURE — 99214 OFFICE O/P EST MOD 30 MIN: CPT | Performed by: PHYSICIAN ASSISTANT

## 2024-03-14 RX ORDER — BENZONATATE 100 MG/1
100 CAPSULE ORAL 3 TIMES DAILY PRN
COMMUNITY
Start: 2023-11-20

## 2024-03-14 RX ORDER — TRAZODONE HYDROCHLORIDE 50 MG/1
100 TABLET ORAL
Qty: 180 TABLET | Refills: 1 | Status: SHIPPED | OUTPATIENT
Start: 2024-03-14

## 2024-03-14 NOTE — PSYCH
"  This note was not shared with the patient due to reasonable likelihood of causing patient harm    PROGRESS NOTE        [facility]      Name and Date of Birth:  Freya Leon 63 y.o. 1960    Date of Visit: 03/14/24    SUBJECTIVE:  Freya was seen for follow-up of bipolar disorder, insomnia and for medication management.  Reports that she is \"terrible \".  Continues to report ongoing insomnia.  States that she sleeps about 5-1/2 hours at night and then is unable to nap during the day.  States that she will lay down for about an hour during the day but does not fall asleep.  States \"I think I am depressed \".  She has not been doing much during the day.  Has decreased interest motivation.  At her last visit she was restarted on Vraylar which she had not continue due to not receiving a refill.  Has not noted much of an improvement with her depression.  States that her insomnia is the same.  States that she is not taking trazodone though every night because she \"read something on the Internet that you should not take it every night \".  Uncertain what she is referring to and we discussed that the trazodone is not addictive or habit forming.  She had not tried the 100 mg dose at night that we had prescribed in the past.  Discussed that if she was on trazodone with concomitant serotonin increasing medications that could potentially cause serotonin syndrome which we discussed.  She is in agreement with trial of this though.  She is not having any side effects with very large is in agreement with allowing more time for response.  Physically reports that she feels tired during the day.  She has been following up with oncology as scheduled for lung cancer.      She denies suicidal ideation, intent or plan at present, has no suicidal ideation, intent or plan at present.    She denies any auditory hallucinations and visual hallucinations, denies any other delusional " thinking, denies any delusional thinking.    She denies any side effects from medications  .  HPI ROS Appetite Changes and Sleep: normal appetite, decreased sleep    Review Of Systems:      Constitutional Negative   ENT Negative   Cardiovascular Negative   Respiratory Negative   Gastrointestinal Negative   Genitourinary Negative   Musculoskeletal Negative   Integumentary Negative   Neurological Negative   Endocrine Negative   Other Symptoms Negative and None       Laboratory Results: No results found for this or any previous visit.    Substance Abuse History:    Social History     Substance and Sexual Activity   Drug Use No       Family Psychiatric History:     No family history on file.    The following portions of the patient's history were reviewed and updated as appropriate: past family history, past medical history, past social history, past surgical history and problem list.    Social History     Socioeconomic History    Marital status:      Spouse name: Not on file    Number of children: Not on file    Years of education: Not on file    Highest education level: Not on file   Occupational History    Not on file   Tobacco Use    Smoking status: Every Day     Current packs/day: 1.50     Types: Cigarettes    Smokeless tobacco: Never   Substance and Sexual Activity    Alcohol use: No    Drug use: No    Sexual activity: Not on file   Other Topics Concern    Not on file   Social History Narrative    Not on file     Social Determinants of Health     Financial Resource Strain: Low Risk  (2/19/2024)    Received from [facility]    Overall Financial Resource Strain (CARDIA)     Difficulty of Paying Living Expenses: Not very hard   Food Insecurity: No Food Insecurity (2/19/2024)    Received from [facility]    Hunger Vital Sign     Worried About Running Out of Food in the Last Year: Never true     Ran Out of Food in the Last Year: Never true   Transportation Needs: No  Transportation Needs (2/19/2024)    Received from [facility]    PRAPARE - Transportation     Lack of Transportation (Medical): No     Lack of Transportation (Non-Medical): No   Physical Activity: Not on file   Stress: Not on file   Social Connections: Not on file   Intimate Partner Violence: Not At Risk (2/19/2024)    Received from [facility]    Humiliation, Afraid, Rape, and Kick questionnaire     Fear of Current or Ex-Partner: No     Emotionally Abused: No     Physically Abused: No     Sexually Abused: No   Housing Stability: Low Risk  (2/19/2024)    Received from [facility]    Housing Stability Vital Sign     Unable to Pay for Housing in the Last Year: No     Number of Places Lived in the Last Year: 1     Unstable Housing in the Last Year: No     Social History     Social History Narrative    Not on file        Social History       Tobacco History       Smoking Status  Every Day Current Packs/Day  1.5 packs/day Smoking Tobacco Type  Cigarettes   Pack Year History     Packs/Day From To Years    1.5   0.0      Smokeless Tobacco Use  Never              Alcohol History       Alcohol Use Status  No              Drug Use       Drug Use Status  No              Sexual Activity       Sexually Active  Not Asked              Activities of Daily Living    Not Asked                       OBJECTIVE:     Mental Status Evaluation:    Appearance age appropriate, casually dressed   Behavior pleasant, cooperative   Speech normal volume, normal pitch   Mood    Affect    Thought Processes logical   Associations intact associations   Thought Content normal   Perceptual Disturbances: none   Abnormal Thoughts  Risk Potential Suicidal ideation - None  Homicidal ideation - None  Potential for aggression - No   Orientation oriented to person, place, time/date and situation   Memory recent and remote memory grossly intact   Cosciousness alert and awake   Attention Span attention span and  concentration are age appropriate   Intellect Appears to be of Average Intelligence   Insight age appropriate    Judgement good    Muscle Strength and  Gait muscle strength and tone were normal   Language no difficulty naming common objects   Fund of Knowledge displays adequate knowledge of current events   Pain none   Pain Scale 0       Assessment/Plan:       Diagnoses and all orders for this visit:    Bipolar affective disorder, currently depressed, moderate (HCC)    Psychophysiological insomnia  -     traZODone (DESYREL) 50 mg tablet; Take 2 tablets (100 mg total) by mouth daily at bedtime as needed for sleep    Generalized anxiety disorder    Prolonged grief reaction    Other orders  -     benzonatate (TESSALON PERLES) 100 mg capsule; Take 100 mg by mouth Three times daily as needed          Treatment Recommendations/Precautions:  Continue Vraylar 1.5 mg daily  Continue Lamictal 200 mg nightly  Clonazepam 1 g half twice daily as needed and 2 at bedtime as needed  Trazodone 50 mg will take 2 at night, she has not been taking every night  We will follow-up in 3 months and she will call me sooner if any questions or concerns    Continue current medications:    Risks/Benefits      Risks, Benefits And Possible Side Effects Of Medications:    Risks, benefits, and possible side effects of medications explained to patient and patient verbalizes understanding and agreement for treatment.    Controlled Medication Discussion:     Not applicable    Psychotherapy Provided:     Individual psychotherapy provided: No

## 2024-06-12 ENCOUNTER — OFFICE VISIT (OUTPATIENT)
Dept: PSYCHIATRY | Facility: CLINIC | Age: 64
End: 2024-06-12

## 2024-06-12 DIAGNOSIS — F41.1 GENERALIZED ANXIETY DISORDER: ICD-10-CM

## 2024-06-12 DIAGNOSIS — F31.32 BIPOLAR AFFECTIVE DISORDER, CURRENTLY DEPRESSED, MODERATE (HCC): Primary | ICD-10-CM

## 2024-06-12 DIAGNOSIS — F51.04 PSYCHOPHYSIOLOGICAL INSOMNIA: ICD-10-CM

## 2024-06-12 RX ORDER — LAMOTRIGINE 200 MG/1
200 TABLET ORAL
Qty: 90 TABLET | Refills: 1 | Status: SHIPPED | OUTPATIENT
Start: 2024-06-12

## 2024-06-12 RX ORDER — CLONAZEPAM 1 MG/1
TABLET ORAL
Qty: 270 TABLET | Refills: 0 | Status: SHIPPED | OUTPATIENT
Start: 2024-06-12

## 2024-06-12 NOTE — BH TREATMENT PLAN
TREATMENT PLAN (Medication Management Only)        [facility]    Name and Date of Birth:  Freya Leon 64 y.o. 1960  Date of Treatment Plan: June 12, 2024  Diagnosis/Diagnoses:    1. Bipolar affective disorder, currently depressed, moderate (HCC)    2. Psychophysiological insomnia    3. Generalized anxiety disorder      Strengths/Personal Resources for Self-Care: supportive family, taking medications as prescribed.  Area/Areas of need (in own words):  Anxiety, external stressors  1. Long Term Goal: maintain control of anxiety.  Target Date:6 months - 12/12/2024  Person/Persons responsible for completion of goal: Freya  2.  Short Term Objective (s) - How will we reach this goal?:   A. Provider new recommended medication/dosage changes and/or continue medication(s): continue current medications as prescribed.  B. N/A.  C. N/A.  Target Date:6 months - 12/12/2024  Person/Persons Responsible for Completion of Goal: Freya  Progress Towards Goals: stable  Treatment Modality: Medication management every 3 to 4 months  Review due 180 days from date of this plan: 6 months - 12/12/2024  Expected length of service: maintenance  My Physician/PA/NP and I have developed this plan together and I agree to work on the goals and objectives. I understand the treatment goals that were developed for my treatment.

## 2024-06-12 NOTE — PSYCH
This note was not shared with the patient due to reasonable likelihood of causing patient harm  Time start 1135  End 1204    PROGRESS NOTE        [facility]      Name and Date of Birth:  Freya Leon 64 y.o. 1960    Date of Visit: 06/12/24    SUBJECTIVE:  Freya was seen for follow-up of bipolar disorder, insomnia and for medication management.  At her last visit she was maintained on 1.5 mg dose of Vraylar, Lamictal 200 mg nightly, clonazepam 1 mg half tab twice a day as needed and 2 at bedtime as needed, trazodone 50 mg 1-2 at bedtime as needed.  States that she has been taking trazodone 50 mg every night and has been sleeping well.  States that she is typically Getting 7 to 8 hours of sleep.  Reports that her appetite is fair.  States that she does feel tired later in the morning and questioning if this is due to her progressing cancer.  She also recently had an EGD and is following with gastroenterology next month.  No new medications or other new medical issues noted at this time.  Medication list reviewed and updated.  Continues with residual anxiety and external stressors.  Increased financial stressors recently due to needing new stove and dental work.  Overall appears at her baseline though with her moods.  No suicidal or homicidal ideation and no psychotic signs or symptoms.  No adverse effects noted with medications.          She denies suicidal ideation, intent or plan at present, has no suicidal ideation, intent or plan at present.    She denies any auditory hallucinations and visual hallucinations, denies any other delusional thinking, denies any delusional thinking.    She denies any side effects from medications  .  HPI ROS Appetite Changes and Sleep: normal appetite, normal sleep    Review Of Systems:      Constitutional Negative   ENT Negative   Cardiovascular Negative   Respiratory Negative   Gastrointestinal Negative   Genitourinary  Negative   Musculoskeletal Negative   Integumentary Negative   Neurological Negative   Endocrine Negative   Other Symptoms Negative and None       Laboratory Results: No results found for this or any previous visit.    Substance Abuse History:    Social History     Substance and Sexual Activity   Drug Use No       Family Psychiatric History:     No family history on file.    The following portions of the patient's history were reviewed and updated as appropriate: past family history, past medical history, past social history, past surgical history and problem list.    Social History     Socioeconomic History    Marital status:      Spouse name: Not on file    Number of children: Not on file    Years of education: Not on file    Highest education level: Not on file   Occupational History    Not on file   Tobacco Use    Smoking status: Every Day     Current packs/day: 1.50     Types: Cigarettes    Smokeless tobacco: Never   Substance and Sexual Activity    Alcohol use: No    Drug use: No    Sexual activity: Not on file   Other Topics Concern    Not on file   Social History Narrative    Not on file     Social Determinants of Health     Financial Resource Strain: Low Risk  (2/19/2024)    [facility]    Overall Financial Resource Strain (CARDIA)     Difficulty of Paying Living Expenses: Not very hard   Food Insecurity: No Food Insecurity (2/19/2024)    [facility]    Hunger Vital Sign     Worried About Running Out of Food in the Last Year: Never true     Ran Out of Food in the Last Year: Never true   Transportation Needs: No Transportation Needs (2/19/2024)    [facility], [facility]    PRAPARE - Transportation     Lack of Transportation (Medical): No     Lack of Transportation (Non-Medical): No   Physical Activity: Not on file   Stress: Not on  file   Social Connections: Not on file   Intimate Partner Violence: Not At Risk (2/19/2024)    [facility], [facility]    Humiliation, Afraid, Rape, and Kick questionnaire     Fear of Current or Ex-Partner: No     Emotionally Abused: No     Physically Abused: No     Sexually Abused: No   Housing Stability: Low Risk  (2/19/2024)    [facility], [facility]    Housing Stability Vital Sign     Unable to Pay for Housing in the Last Year: No     Number of Places Lived in the Last Year: 1     Unstable Housing in the Last Year: No     Social History     Social History Narrative    Not on file        Social History       Tobacco History       Smoking Status  Every Day Current Packs/Day  1.5 packs/day Smoking Tobacco Type  Cigarettes   Pack Year History     Packs/Day From To Years    1.5   0.0      Smokeless Tobacco Use  Never              Alcohol History       Alcohol Use Status  No              Drug Use       Drug Use Status  No              Sexual Activity       Sexually Active  Not Asked              Activities of Daily Living    Not Asked                       OBJECTIVE:     Mental Status Evaluation:    Appearance age appropriate, casually dressed   Behavior Calm, good eye contact, cooperative   Speech normal volume, normal pitch   Mood Anxious   Affect Congruent   Thought Processes logical   Associations intact associations   Thought Content normal   Perceptual Disturbances: none   Abnormal Thoughts  Risk Potential Suicidal ideation - None  Homicidal ideation - None  Potential for aggression - No   Orientation oriented to person, place, time/date and situation   Memory recent and remote memory grossly intact   Cosciousness alert and awake   Attention Span attention span and concentration are age appropriate   Intellect Not formally assessed   Insight age appropriate    Judgement good    Muscle Strength and  Gait muscle  strength and tone were normal   Language no difficulty naming common objects   Fund of Knowledge displays adequate knowledge of current events   Pain none   Pain Scale 0       Assessment/Plan:       Diagnoses and all orders for this visit:    Bipolar affective disorder, currently depressed, moderate (HCC)  -     cariprazine (Vraylar) 1.5 MG capsule; Take 1 capsule (1.5 mg total) by mouth every morning  -     clonazePAM (KlonoPIN) 1 mg tablet; 1/2 po bid and 2 po qhs prn    Psychophysiological insomnia    Generalized anxiety disorder  -     clonazePAM (KlonoPIN) 1 mg tablet; 1/2 po bid and 2 po qhs prn  -     lamoTRIgine (LaMICtal) 200 MG tablet; Take 1 tablet (200 mg total) by mouth daily at bedtime          Treatment Recommendations/Precautions:  Vraylar 1.5 mg daily  Trazodone 50 mg 2 at night as needed  Lamictal 200 mg nightly  Clonazepam 1 mg half tab twice a day and 2 nightly as needed  Aware of potential for adverse effects with benzodiazepines including sedation, somnolence, unsteady gait, and respiratory depression especially taking concomitantly with alcohol or narcotics    Continue current medications and follow-up in 3 to 4 months  She will call sooner if any questions or concerns  Aware of after-hours on-call service and [phone] crisis line:    Risks/Benefits      Risks, Benefits And Possible Side Effects Of Medications:    Risks, benefits, and possible side effects of medications explained to patient and patient verbalizes understanding and agreement for treatment.    Controlled Medication Discussion:   PDMP reviewed, taking as prescribed  Psychotherapy Provided:     Individual psychotherapy provided: Swati

## 2024-10-09 ENCOUNTER — OFFICE VISIT (OUTPATIENT)
Dept: PSYCHIATRY | Facility: CLINIC | Age: 64
End: 2024-10-09
Payer: COMMERCIAL

## 2024-10-09 DIAGNOSIS — F51.04 PSYCHOPHYSIOLOGICAL INSOMNIA: ICD-10-CM

## 2024-10-09 DIAGNOSIS — F41.1 GENERALIZED ANXIETY DISORDER: ICD-10-CM

## 2024-10-09 DIAGNOSIS — G31.84 MCI (MILD COGNITIVE IMPAIRMENT): Primary | ICD-10-CM

## 2024-10-09 DIAGNOSIS — F31.32 BIPOLAR AFFECTIVE DISORDER, CURRENTLY DEPRESSED, MODERATE (HCC): ICD-10-CM

## 2024-10-09 PROCEDURE — 99214 OFFICE O/P EST MOD 30 MIN: CPT | Performed by: PHYSICIAN ASSISTANT

## 2024-10-09 RX ORDER — TRAZODONE HYDROCHLORIDE 100 MG/1
100 TABLET ORAL
Qty: 90 TABLET | Refills: 0 | Status: SHIPPED | OUTPATIENT
Start: 2024-10-09

## 2024-10-09 RX ORDER — CLONAZEPAM 1 MG/1
TABLET ORAL
Qty: 180 TABLET | Refills: 0 | Status: SHIPPED | OUTPATIENT
Start: 2024-10-09

## 2024-10-09 NOTE — ASSESSMENT & PLAN NOTE
Reduce clonazepam to 1-1/2 at nighttime only  States that she has been taking 2 at bedtime and not taking any during the day  Will change prescription to reflect this  Orders:    clonazePAM (KlonoPIN) 1 mg tablet; 1 and 1/2 po qhs prn

## 2024-10-09 NOTE — PSYCH
MEDICATION MANAGEMENT NOTE        [facility]  This note was not shared with the patient due to reasonable likelihood of causing patient harm      Name and Date of Birth:  Freya Leon 64 y.o. 1960 MRN: [id number]    Insurance: Payor: [facility] REP / Plan: [employer] MEDICARE PPO  REP / Product Type: Medicare PPO /     Date of Visit: October 9, 2024    Reason for Visit:   Chief Complaint   Patient presents with    Follow-up    Medication Management       Assessment & Plan  Bipolar affective disorder, currently depressed, moderate (HCC)  Continue Lamictal 200 mg nightly  Continue Vraylar 1.5 mg daily  Orders:    clonazePAM (KlonoPIN) 1 mg tablet; 1 and 1/2 po qhs prn    Generalized anxiety disorder  Reduce clonazepam to 1-1/2 at nighttime only  States that she has been taking 2 at bedtime and not taking any during the day  Will change prescription to reflect this  Orders:    clonazePAM (KlonoPIN) 1 mg tablet; 1 and 1/2 po qhs prn    Psychophysiological insomnia  Increase trazodone to 100 mg at night  Orders:    traZODone (DESYREL) 100 mg tablet; Take 1 tablet (100 mg total) by mouth daily at bedtime as needed for sleep    MCI (mild cognitive impairment)  Reduce dosage of Klonopin as listed above  On exam today she was alert and oriented x 3, immediate recall was 3 out of 3 and delayed recall was 2-1/2 out of 3 (recalled apple, table, pen instead of Pam).  Clock draw was abnormal with short hand pointing at #1 instead of 11, otherwise within normal limits  Healthy diet and exercise  She is to follow-up with [facility] geriatrics and was referred by her family doctor         We will follow-up in 6 to 8 weeks and she will call me sooner if any questions or concerns    - Educated about healthy life style, risk of falls/sedation and addiction. Patient was receptive to education.  - Medications sent to the patient's pharmacy for 90 day supply    - RTC in 2 months  -  The patient was educated about 24 hour and weekend coverage for urgent situations accessed by calling [facility] main practice number    Risks/benefits/alternativies to treatment discussed, including potential adverse medication side effects, to which Freya voiced understanding and consented fully to treatment.  Also, patient is amenable to calling/contacting the outpatient office including this writer if any acute adverse effects of their medication regimen arise in addition to any comments or concerns pertaining to their psychiatric management.      Medications Risks/Benefits      Risks, Benefits And Possible Side Effects Of Medications:    Risks, benefits, and possible side effects of medications explained to Freya including risks of misuse, abuse or dependence, sedation and respiratory depression related to treatment with benzodiazepine medications. She verbalizes understanding and agreement for treatment.    Controlled Medication Discussion:     Freya has been filling controlled prescriptions on time as prescribed according to [place] Prescription Drug Monitoring Program         Subjective      Freya Leon is a 64 y.o. female, visited for Follow-up and Medication Management, who was personally seen and evaluated today at the [facility] outpatient clinic for follow-up and medication management. Completes psychiatric assessment without difficulty.     At previous outpatient psychiatric appointment with this writer, has been maintained on medications.   She denies any current adverse medication side effects.      Overall, Freya states that she feels about the same with her moods.  Continues to report difficulty with sleep and states that she feels tired at about 3 hours after she awakens in the morning.  States that this has been ongoing and this is not new.  Discussed concerns she had at urology appointment at the end of June.  States that the  provider was asking her questions such as how many times she went to the bathroom and because her answers were varying Freya states that the provider thought she had dementia and did not want her to drive and wanted to contact her children.  She was seen by her PCP who referred her to geriatric medicine for additional testing and evaluation.  At her appointment today Freya was able to recall the 3 words that her family doctor had asked her.  Also able to tell me what she did wrong on her clock draw.  Long-term memory is good.  We had discussed in the past concerns of clonazepam and states that she has been taking 2 at night.  She has been resistant to changes medication in the past however we discussed again this could be impacting her cognition.  Discussed gradual dose reduction especially since she states that she is not sleeping well despite taking it.  She is in agreement with reduction by 0.5 mg at night to 1-1/2 and increase the trazodone to 100 mg.  States that she has not taken clonazepam during the day.  Will continue with Vraylar as ordered and Lamictal.  No evidence of karol.  Appears dysthymic, states she is anxious but does not appear visibly anxious.  Physically reports she has chronic pain which states has not changed.  Medications reviewed and updated.    She is alert and oriented x 3, recall is 2-1/2 out of 3, immediate recall is 3 out of 3, long-term memory is intact, aware of some current events, states that she does not watch the news or read the newspaper though.  Clock draw was abnormal for short hand pointing to #1 incident of 11, otherwise within normal limits.        Review Of Systems:  Pertinent items are noted in HPI; all others are negative; no recent changes in medications or health status reported.    PHQ-2/9 Depression Screening             MARNI-7 Flowsheet Screening      Flowsheet Row Most Recent Value   Over the last two weeks, how often have you been bothered by the following  problems?     Feeling nervous, anxious, or on edge 2    Not being able to stop or control worrying 2    Worrying too much about different things 2    Trouble relaxing  2    Being so restless that it's hard to sit still 1    Becoming easily annoyed or irritable  1    Feeling afraid as if something awful might happen 1    How difficult have these problems made it for you to do your work, take care of things at home, or get along with other people?  Somewhat difficult    MARNI Score  11             Historical Information    Past Psychiatric History Update:   - No inpatient psychiatric admission since last encounter  - No SA or SIB since last encounter  - No incidence of violent behavior since last encounter    Past Trauma History Update:   - No new onset of abuse or traumatic events since last encounter     Past Medical History:    Past Medical History:   Diagnosis Date    Generalized anxiety disorder 10/29/2014        Past Surgical History:   Procedure Laterality Date    CT NEEDLE BIOPSY LUNG  2/8/2023     No Known Allergies    Substance Abuse History:    Social History     Substance and Sexual Activity   Alcohol Use No     Social History     Substance and Sexual Activity   Drug Use No       Social History:    Social History     Socioeconomic History    Marital status:      Spouse name: Not on file    Number of children: Not on file    Years of education: Not on file    Highest education level: Not on file   Occupational History    Not on file   Tobacco Use    Smoking status: Every Day     Current packs/day: 1.50     Types: Cigarettes    Smokeless tobacco: Never   Substance and Sexual Activity    Alcohol use: No    Drug use: No    Sexual activity: Not on file   Other Topics Concern    Not on file   Social History Narrative    Not on file     Social Determinants of Health     Financial Resource Strain: Low Risk  (2/19/2024)    Received [facility], [facility]    Overall  Financial Resource Strain (CARDIA)     Difficulty of Paying Living Expenses: Not very hard   Food Insecurity: No Food Insecurity (2/19/2024)    [facility]    Hunger Vital Sign     Worried About Running Out of Food in the Last Year: Never true     Ran Out of Food in the Last Year: Never true   Transportation Needs: No Transportation Needs (2/19/2024)    [facility], [facility]    PRAPARE - Transportation     Lack of Transportation (Medical): No     Lack of Transportation (Non-Medical): No   Physical Activity: Not on file   Stress: Not on file   Social Connections: Not on file   Intimate Partner Violence: Not At Risk (2/19/2024)    [facility]    Humiliation, Afraid, Rape, and Kick questionnaire     Fear of Current or Ex-Partner: No     Emotionally Abused: No     Physically Abused: No     Sexually Abused: No   Housing Stability: Low Risk  (2/19/2024)    [facility]    Housing Stability Vital Sign     Unable to Pay for Housing in the Last Year: No     Number of Places Lived in the Last Year: 1     Unstable Housing in the Last Year: No       Family Psychiatric History:     No family history on file.    History Review: The following portions of the patient's history were reviewed and updated as appropriate: allergies, current medications, past family history, past medical history, past social history, past surgical history and problem list.           Objective      Vital signs in last 24 hours:  There were no vitals filed for this visit.    Mental Status Evaluation:    Appearance age appropriate, casually dressed, dressed appropriately   Behavior cooperative, calm   Speech normal rate, normal volume, normal pitch   Mood anxious   Affect constricted   Thought Processes circumstantial    Associations circumstantial associations   Thought Content no overt delusions   Perceptual Disturbances: no auditory hallucinations, no visual hallucinations   Abnormal Thoughts  Risk Potential Suicidal ideation - None  Homicidal ideation - None  Potential for aggression - No   Orientation oriented to: person, place, time/date, and situation   Memory short term memory mildly impaired, recall 2-1/2 out of 3, long term memory grossly intact, immediate memory intact   Consciousness alert and awake   Attention Span Concentration Span attention span and concentration appear shorter than expected for age   Intellect not formally assessed   Insight intact   Judgement intact   Muscle Strength and  Gait normal muscle strength and normal muscle tone, normal gait and normal balance   Motor activity no abnormal movements   Language no difficulty naming common objects   Fund of Knowledge impaired knowledge of current events, aware of presidential Carola Mir but unable to recall who was running against her, also able to state last presidents as Obama, biotin and now potentially Clarek, could not recall Javon Llanes's name   Pain mild   Pain Scale 5             Current Outpatient Medications   Medication Sig Dispense Refill    clonazePAM (KlonoPIN) 1 mg tablet 1 and 1/2 po qhs prn 180 tablet 0    traZODone (DESYREL) 100 mg tablet Take 1 tablet (100 mg total) by mouth daily at bedtime as needed for sleep 90 tablet 0    Anoro Ellipta 62.5-25 MCG/ACT inhaler       atorvastatin (LIPITOR) 20 mg tablet Take 20 mg by mouth      benzonatate (TESSALON PERLES) 100 mg capsule Take 100 mg by mouth Three times daily as needed      cariprazine (Vraylar) 1.5 MG capsule Take 1 capsule (1.5 mg total) by mouth every morning 90 capsule 1    celecoxib (CeleBREX) 200 mg capsule TAKE ONE CAPSULE BY MOUTH TWICE A DAY      Cholecalciferol (VITAMIN D3) 1000 units CAPS TAKE 1 CAPSULE (50,000 UNITS TOTAL) BY MOUTH ONCE A WEEK FOR 12 DOSES.      Eliquis  5 MG 5 mg 2 (two) times a day      fenofibrate (FENOGLIDE) 120 MG TABS Take 120 mg by mouth      lamoTRIgine (LaMICtal) 200 MG tablet Take 1 tablet (200 mg total) by mouth daily at bedtime 90 tablet 1    LINZESS 145 MCG CAPS 1 (ONE) CAPSULE 30 MIN PRIOR TO MEAL  1    metoprolol succinate (TOPROL-XL) 25 mg 24 hr tablet Take 25 mg by mouth      pantoprazole (PROTONIX) 40 mg tablet Take by mouth      PROAIR RESPICLICK 108 (90 Base) MCG/ACT AEPB Inhale 2 puffs every 4 (four) hours as needed  3    Restasis 0.05 % ophthalmic emulsion INSTILL 1 DROP INTO BOTH EYES TWICE A DAY       No current facility-administered medications for this visit.         Psychotherapy Provided:     Individual psychotherapy provided: No         Visit Time    Visit Start Time: 1132  Visit Stop Time: 1207  Total Visit Duration:  35 minutes    Celia Lambert PA-C 10/09/24    This note was completed in part utilizing Dragon dictation Software. Grammatical, translation, syntax errors, random word insertions, spelling mistakes, and incomplete sentences may be an occasional consequence of this system secondary to software limitations with voice recognition, ambient noise, and hardware issues. If you have any questions or concerns about the content, text, or information contained within the body of this dictation, please contact the provider for clarification.

## 2024-10-09 NOTE — ASSESSMENT & PLAN NOTE
Continue Lamictal 200 mg nightly  Continue Vraylar 1.5 mg daily  Orders:    clonazePAM (KlonoPIN) 1 mg tablet; 1 and 1/2 po qhs prn

## 2024-10-11 ENCOUNTER — TELEPHONE (OUTPATIENT)
Dept: PSYCHIATRY | Facility: CLINIC | Age: 64
End: 2024-10-11

## 2024-10-11 NOTE — TELEPHONE ENCOUNTER
Freya was transferred to this writer from the call center. She was asking how to take her clonazepam. She explained that Celia had decreased it to 1.5 tabs at bedtime as needed because she was having some memory issues. She said at times she would take 1/2 tab during the day for panic attacks. She said Celia knows this and it is once in a blue moon. She is going through her medications and wants to be sure on the instructions. I informed her Celia is out of office until next week. I told her if Celia had told her it was ok to take 1/2 tab for a rare panic attack, then to continue this over the weekend if she happens to get one. I told her next week when Celia reviews we will follow up with more permanent instructions. Forwarding to primary provider for review, and a covering provider incase there is further advice.

## 2024-10-14 NOTE — TELEPHONE ENCOUNTER
Celia Lambert PA-C  You39 minutes ago (2:41 PM)     Denis Khoury, thank you for speaking with Freya on Friday.  At her appointment I did provide her with written instructions regarding her medications and how to take them.  We reduced clonazepam to 1-1/2 tabs at night.  I would like her to refrain from taking during the day unless absolutely necessary.  Thank you and please let me know if any further questions!       KATHY left informing her to refrain from taking it unless absolutely necessary. Call back number provided if she has any further questions.

## 2024-11-29 ENCOUNTER — TELEPHONE (OUTPATIENT)
Dept: PSYCHIATRY | Facility: CLINIC | Age: 64
End: 2024-11-29

## 2024-12-02 ENCOUNTER — OFFICE VISIT (OUTPATIENT)
Dept: PSYCHIATRY | Facility: CLINIC | Age: 64
End: 2024-12-02
Payer: COMMERCIAL

## 2024-12-02 DIAGNOSIS — G31.84 MCI (MILD COGNITIVE IMPAIRMENT): ICD-10-CM

## 2024-12-02 DIAGNOSIS — F41.1 GENERALIZED ANXIETY DISORDER: ICD-10-CM

## 2024-12-02 DIAGNOSIS — F51.04 PSYCHOPHYSIOLOGICAL INSOMNIA: ICD-10-CM

## 2024-12-02 DIAGNOSIS — F31.32 BIPOLAR AFFECTIVE DISORDER, CURRENTLY DEPRESSED, MODERATE (HCC): Primary | ICD-10-CM

## 2024-12-02 PROCEDURE — 99214 OFFICE O/P EST MOD 30 MIN: CPT | Performed by: PHYSICIAN ASSISTANT

## 2024-12-02 RX ORDER — FLUTICASONE FUROATE, UMECLIDINIUM BROMIDE AND VILANTEROL TRIFENATATE 200; 62.5; 25 UG/1; UG/1; UG/1
POWDER RESPIRATORY (INHALATION)
COMMUNITY
Start: 2024-11-26

## 2024-12-02 NOTE — ASSESSMENT & PLAN NOTE
Clonazepam 1 mg 1-1/2 at bedtime prn, reduced to 1 mg at bedtime  Discussed that she may take half a tablet if absolutely necessary during the day for panic attack

## 2024-12-02 NOTE — PSYCH
MEDICATION MANAGEMENT NOTE        [facility]      Name and Date of Birth:  Freya Leon 64 y.o. 1960 MRN: [id number]    Insurance: Payor: [facility] REP / Plan: [employer] MEDICARE PPO  REP / Product Type: Medicare PPO /     Date of Visit: December 2, 2024    Reason for Visit:   Chief Complaint   Patient presents with    Medication Management    Follow-up       Assessment & Plan  Bipolar affective disorder, currently depressed, moderate (HCC)  Continue Lamictal 200 mg nightly  Vraylar 1.5 mg daily       Generalized anxiety disorder  Clonazepam 1 mg 1-1/2 at bedtime prn, reduced to 1 mg at bedtime  Discussed that she may take half a tablet if absolutely necessary during the day for panic attack       Psychophysiological insomnia  Trazodone 100 mg nightly as needed       MCI (mild cognitive impairment)  Has referral to geriatric medicine through [facility]         This note was not shared with the patient due to reasonable likelihood of causing patient harm      We will follow-up in about 1 month and she will call me sooner if any questions or concerns    Risks/benefits/alternativies to treatment discussed, including potential adverse medication side effects, to which Freya voiced understanding and consented fully to treatment.  Also, patient is amenable to calling/contacting the outpatient office including this writer if any acute adverse effects of their medication regimen arise in addition to any comments or concerns pertaining to their psychiatric management.      Medications Risks/Benefits      Risks, Benefits And Possible Side Effects Of Medications:    Risks, benefits, and possible side effects of medications explained to Freya including risks of misuse, abuse or dependence, sedation and respiratory depression related to treatment with benzodiazepine medications. She verbalizes understanding and agreement for treatment.    Controlled  "Medication Discussion:     Freya has been filling controlled prescriptions on time as prescribed according to [place] Prescription Drug Monitoring Program         Subjective      Freya Leon is a 64 y.o. female, visited for Medication Management and Follow-up, who was personally seen and evaluated today at the [facility] outpatient clinic for follow-up and medication management. Completes psychiatric assessment without difficulty.     At previous outpatient psychiatric appointment with this writer, clonazepam was reduced to 1 mg 1-1/2 tabs as needed, she was maintained on Lamictal 200 mg nightly and Vraylar 1.5 mg daily.   She denies any current adverse medication side effects.      Overall, Freya continues to appear flat, depressed, apathetic.  She is negative and pessimistic.  States that she is \"terrible\".  Describing multiple things that are going wrong within her home.  She is stating contradictory things at times.  States that she does not want to move into an apartment complex because she does not want to be around a lot of people.  Talking about her neighbor who is a reported pedophile.  Then states that she has difficulty living at home because she lives by herself and feels lonely.  States that she was involved with a Bible study but this is only in the spring and fall and is going on the winter.  She has a son who calls her and Dana goes to gym however she has not gone in 2 months.  Discussed her frustrations with her lack of relationship with her daughter.  Also states that she is frustrated with electronic medical records and \"my doctors saying they did things they did not \".  Negative and pessimistic.  She continues to smoke cigarettes reportedly 5/day despite diagnosis of lung cancer and reported difficulty ambulating.  States that she had a parking or further parking spot to get to my office today so feels short of breath.  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  On a positive note states that she has not noticed any change with reducing the Klonopin.  She is in agreement to continue gradual dose reduction.  States that she has been sleeping 8 hours at night.  Appetite is fair and she is trying to watch her diet.  \"They told me I need to lose 30 pounds \".  States that she has lower back pain when she does too many things such as cleaning.  Currently she denies any pain and states that she is comfortable when she is seated.  Medication list reviewed and updated.      Review Of Systems:  Pertinent items are noted in HPI; all others are negative; no recent changes in medications or health status reported.    PHQ-2/9 Depression Screening             MARNI-7 Flowsheet Screening      Flowsheet Row Most Recent Value   Over the last two weeks, how often have you been bothered by the following problems?     Feeling nervous, anxious, or on edge 1    Not being able to stop or control worrying 1    Worrying too much about different things 1    Trouble relaxing  1    Being so restless that it's hard to sit still 0    Becoming easily annoyed or irritable  1    Feeling afraid as if something awful might happen 0    How difficult have these problems made it for you to do your work, take care of things at home, or get along with other people?  Somewhat difficult    MARNI Score  5             Historical Information    Past Psychiatric History Update:   - No inpatient psychiatric admission since last encounter  - No SA or SIB since last encounter  - No incidence of violent behavior since last encounter    Past Trauma History Update:   - No new onset of abuse or traumatic events since last encounter     Past Medical History:    Past Medical History:   Diagnosis Date    Generalized anxiety disorder 10/29/2014        Past Surgical History:   Procedure Laterality Date    CT NEEDLE BIOPSY " LUNG  2/8/2023     No Known Allergies    Substance Abuse History:    Social History     Substance and Sexual Activity   Alcohol Use No     Social History     Substance and Sexual Activity   Drug Use No       Social History:    Social History     Socioeconomic History    Marital status:      Spouse name: Not on file    Number of children: Not on file    Years of education: Not on file    Highest education level: Not on file   Occupational History    Not on file   Tobacco Use    Smoking status: Every Day     Current packs/day: 1.50     Types: Cigarettes    Smokeless tobacco: Never   Substance and Sexual Activity    Alcohol use: No    Drug use: No    Sexual activity: Not on file   Other Topics Concern    Not on file   Social History Narrative    Not on file     Social Drivers of Health     Financial Resource Strain: Low Risk  (2/19/2024)    [facility]    Overall Financial Resource Strain (CARDIA)     Difficulty of Paying Living Expenses: Not very hard   Food Insecurity: No Food Insecurity (2/19/2024)    [facility]    Hunger Vital Sign     Worried About Running Out of Food in the Last Year: Never true     Ran Out of Food in the Last Year: Never true   Transportation Needs: No Transportation Needs (2/19/2024)    [facility]    PRAPARE - Transportation     Lack of Transportation (Medical): No     Lack of Transportation (Non-Medical): No   Physical Activity: Not on file   Stress: Not on file   Social Connections: Not on file   Intimate Partner Violence: Not At Risk (2/19/2024)    [facility]    Humiliation, Afraid, Rape, and Kick questionnaire     Fear of Current or Ex-Partner: No     Emotionally Abused: No     Physically Abused: No     Sexually Abused: No   Housing  Stability: Low Risk  (2/19/2024)    [facility]    Housing Stability Vital Sign     Unable to Pay for Housing in the Last Year: No     Number of Places Lived in the Last Year: 1     Unstable Housing in the Last Year: No       Family Psychiatric History:     No family history on file.    History Review: The following portions of the patient's history were reviewed and updated as appropriate: allergies, current medications, past family history, past medical history, past social history, past surgical history and problem list.           Objective      Vital signs in last 24 hours:  There were no vitals filed for this visit.    Mental Status Evaluation:    Appearance age appropriate, casually dressed, dressed appropriately   Behavior psychomotor retardation   Speech tangential   Mood depressed, irritable   Affect flat   Thought Processes tangential   Associations tangential associations   Thought Content no overt delusions   Perceptual Disturbances: no auditory hallucinations, no visual hallucinations   Abnormal Thoughts  Risk Potential Suicidal ideation - None  Homicidal ideation - None  Potential for aggression - No   Orientation oriented to: person, place, time/date, and situation   Memory recent and remote memory grossly intact   Consciousness alert and awake   Attention Span Concentration Span decreased concentration   Intellect not formally assessed   Insight fair   Judgement fair   Muscle Strength and  Gait normal muscle strength and normal muscle tone, normal gait and normal balance   Motor activity no abnormal movements   Language no difficulty naming common objects   Fund of Knowledge adequate knowledge of current events   Pain mild   Pain Scale 5             Current Outpatient Medications   Medication Sig Dispense Refill    Trelegy Ellipta 200-62.5-25 MCG/ACT AEPB inhaler       atorvastatin (LIPITOR) 20 mg tablet Take 20 mg by mouth      benzonatate  (TESSALON PERLES) 100 mg capsule Take 100 mg by mouth Three times daily as needed      cariprazine (Vraylar) 1.5 MG capsule Take 1 capsule (1.5 mg total) by mouth every morning 90 capsule 1    celecoxib (CeleBREX) 200 mg capsule TAKE ONE CAPSULE BY MOUTH TWICE A DAY      Cholecalciferol (VITAMIN D3) 1000 units CAPS TAKE 1 CAPSULE (50,000 UNITS TOTAL) BY MOUTH ONCE A WEEK FOR 12 DOSES.      clonazePAM (KlonoPIN) 1 mg tablet 1 and 1/2 po qhs prn 180 tablet 0    Eliquis 5 MG 5 mg 2 (two) times a day      fenofibrate (FENOGLIDE) 120 MG TABS Take 120 mg by mouth      lamoTRIgine (LaMICtal) 200 MG tablet Take 1 tablet (200 mg total) by mouth daily at bedtime 90 tablet 1    LINZESS 145 MCG CAPS 1 (ONE) CAPSULE 30 MIN PRIOR TO MEAL  1    metoprolol succinate (TOPROL-XL) 25 mg 24 hr tablet Take 25 mg by mouth      pantoprazole (PROTONIX) 40 mg tablet Take by mouth      PROAIR RESPICLICK 108 (90 Base) MCG/ACT AEPB Inhale 2 puffs every 4 (four) hours as needed  3    Restasis 0.05 % ophthalmic emulsion INSTILL 1 DROP INTO BOTH EYES TWICE A DAY      traZODone (DESYREL) 100 mg tablet Take 1 tablet (100 mg total) by mouth daily at bedtime as needed for sleep 90 tablet 0     No current facility-administered medications for this visit.         Psychotherapy Provided:     Individual psychotherapy provided: No         Visit Time    Visit Start Time: 1127  Visit Stop Time: 1208  Total Visit Duration:  40 minutes    Celia Lambert PA-C 12/02/24    This note was completed in part utilizing Dragon dictation Software. Grammatical, translation, syntax errors, random word insertions, spelling mistakes, and incomplete sentences may be an occasional consequence of this system secondary to software limitations with voice recognition, ambient noise, and hardware issues. If you have any questions or concerns about the content, text, or information contained within the body of this dictation, please contact the provider for clarification.

## 2024-12-02 NOTE — BH TREATMENT PLAN
"TREATMENT PLAN (Medication Management Only)        [facility] - [facility]    Name and Date of Birth:  Freya Leon 64 y.o. 1960  Date of Treatment Plan: December 2, 2024  Diagnosis/Diagnoses:    1. Bipolar affective disorder, currently depressed, moderate (HCC)    2. Generalized anxiety disorder    3. Psychophysiological insomnia    4. MCI (mild cognitive impairment)      Strengths/Personal Resources for Self-Care: \"son\".  Area/Areas of need (in own words): \"get to gym twice a week\"  1. Long Term Goal: improve control of depression.  Target Date:1 month - 1/2/2025  Person/Persons responsible for completion of goal: Freya  2.  Short Term Objective (s) - How will we reach this goal?:   A. Provider new recommended medication/dosage changes and/or continue medication(s):  Decrease Klonopin to 1 mg bedtime, and continue Vraylar and Lamictal .  B. N/A.  C. N/A.  Target Date:1 month - 1/2/2025  Person/Persons Responsible for Completion of Goal: Freya  Progress Towards Goals: stable  Treatment Modality: medication management every 1 month  Review due 180 days from date of this plan: 6 months - 6/2/2025  Expected length of service: maintenance  My Physician/PA/NP and I have developed this plan together and I agree to work on the goals and objectives. I understand the treatment goals that were developed for my treatment.      "

## 2024-12-14 DIAGNOSIS — F41.1 GENERALIZED ANXIETY DISORDER: ICD-10-CM

## 2024-12-16 RX ORDER — LAMOTRIGINE 200 MG/1
200 TABLET ORAL
Qty: 90 TABLET | Refills: 1 | Status: SHIPPED | OUTPATIENT
Start: 2024-12-16

## 2024-12-20 DIAGNOSIS — F51.04 PSYCHOPHYSIOLOGICAL INSOMNIA: ICD-10-CM

## 2024-12-20 RX ORDER — TRAZODONE HYDROCHLORIDE 100 MG/1
100 TABLET ORAL
Qty: 90 TABLET | Refills: 0 | Status: SHIPPED | OUTPATIENT
Start: 2024-12-20

## 2024-12-27 ENCOUNTER — TELEPHONE (OUTPATIENT)
Age: 64
End: 2024-12-27

## 2024-12-27 NOTE — TELEPHONE ENCOUNTER
Pt called to get her appt for 1/13/25 at 11:30am switched to a virtual visit due to incase of inclement weather.  Writer switched appt.

## 2025-01-13 ENCOUNTER — TELEPHONE (OUTPATIENT)
Dept: PSYCHIATRY | Facility: CLINIC | Age: 65
End: 2025-01-13

## 2025-01-13 DIAGNOSIS — F51.04 PSYCHOPHYSIOLOGICAL INSOMNIA: ICD-10-CM

## 2025-01-13 NOTE — TELEPHONE ENCOUNTER
Called and left message notifying patient that her 11:30 Am appt was cancelled due to provider is out of the office.

## 2025-01-14 RX ORDER — TRAZODONE HYDROCHLORIDE 100 MG/1
100 TABLET ORAL
Qty: 90 TABLET | Refills: 0 | OUTPATIENT
Start: 2025-01-14

## 2025-01-15 NOTE — TELEPHONE ENCOUNTER
Patient called rx refill line inquiring as to why her Trazodone refill request was denied. Patient was informed that she received a 3 month supply in December, that it is too early, directed to contact when she has a week supply remaining. Understanding verbalized.    PAST SURGICAL HISTORY:  History of D&C     History of skin cancer Moh's surgery for squamous cell CA on the nose    Knee injury left knee orthoscopic surgery 1989    S/P appendectomy 2000    S/P cholecystectomy 2004     PAST SURGICAL HISTORY:  History of D&C     History of skin cancer Moh's surgery for squamous cell CA on the nose    Knee injury left knee orthoscopic surgery 1989    S/P appendectomy 2000    S/P cholecystectomy 2004    S/P right knee arthroscopy

## 2025-01-20 ENCOUNTER — TELEMEDICINE (OUTPATIENT)
Dept: PSYCHIATRY | Facility: CLINIC | Age: 65
End: 2025-01-20
Payer: COMMERCIAL

## 2025-01-20 ENCOUNTER — TELEPHONE (OUTPATIENT)
Dept: PSYCHIATRY | Facility: CLINIC | Age: 65
End: 2025-01-20

## 2025-01-20 DIAGNOSIS — G31.84 MCI (MILD COGNITIVE IMPAIRMENT): ICD-10-CM

## 2025-01-20 DIAGNOSIS — F31.32 BIPOLAR AFFECTIVE DISORDER, CURRENTLY DEPRESSED, MODERATE (HCC): Primary | ICD-10-CM

## 2025-01-20 DIAGNOSIS — F51.04 PSYCHOPHYSIOLOGICAL INSOMNIA: ICD-10-CM

## 2025-01-20 DIAGNOSIS — F41.1 GENERALIZED ANXIETY DISORDER: ICD-10-CM

## 2025-01-20 PROCEDURE — 98014 SYNCH AUDIO-ONLY EST MOD 30: CPT | Performed by: PHYSICIAN ASSISTANT

## 2025-01-20 RX ORDER — CLONAZEPAM 1 MG/1
TABLET ORAL
Start: 2025-01-20

## 2025-01-20 RX ORDER — SUVOREXANT 10 MG/1
10 TABLET, FILM COATED ORAL
Qty: 30 TABLET | Refills: 1 | Status: SHIPPED | OUTPATIENT
Start: 2025-01-20

## 2025-01-20 NOTE — ASSESSMENT & PLAN NOTE
Clonazepam 1 mg taking 1 at bedtime, will decrease to a half at bedtime for the next 4 nights then stop    She may take clonazepam 1 mg a half of a tablet once a day (0.5 mg) as needed for panic attacks    Orders:    clonazePAM (KlonoPIN) 1 mg tablet; 1/2 po qhs x 4 nights then stop and cont 1/2 po qd prn basis only

## 2025-01-20 NOTE — TELEPHONE ENCOUNTER
Patient called in to schedule their 2 month medication management follow up.    Patient is now scheduled on 3/17 @12 virtually

## 2025-01-20 NOTE — PSYCH
Virtual Regular Visit    Patient is located at Home in the following state in which I hold an active license [place].    Assessment & Plan  Bipolar affective disorder, currently depressed, moderate (HCC)    Continue Vraylar 1.5 mg daily    Continue Lamictal 200 mg daily    Orders:    clonazePAM (KlonoPIN) 1 mg tablet; 1/2 po qhs x 4 nights then stop and cont 1/2 po qd prn basis only    Generalized anxiety disorder    Clonazepam 1 mg taking 1 at bedtime, will decrease to a half at bedtime for the next 4 nights then stop    She may take clonazepam 1 mg a half of a tablet once a day (0.5 mg) as needed for panic attacks    Orders:    clonazePAM (KlonoPIN) 1 mg tablet; 1/2 po qhs x 4 nights then stop and cont 1/2 po qd prn basis only    Psychophysiological insomnia  Start Belsomra 10 mg 1 at bedtime after clonazepam is discontinued    She has been trialed on zolpidem, Lunesta, clonazepam and trazodone    We will utilize trazodone 100 mg half to 1 at bedtime as needed basis only    Trazodone 100 mg nightly as needed    Orders:    Suvorexant (Belsomra) 10 MG TABS; Take 1 tablet (10 mg total) by mouth daily at bedtime    MCI (mild cognitive impairment)  Has referral to geriatric medicine through [facility]             We will follow-up in 2 months and Freya will call sooner if any questions or concerns    Verbalized understanding and agreement with above treatment plan    Reason for visit is   Chief Complaint   Patient presents with    Medication Management    Follow-up        Visit Time  Visit Start Time: 330  Visit Stop Time: 400  Total Visit Duration: 30 minutes    Encounter provider: Celia Lambert PA-C  Provider located at [facility] [address] [place] [id number] [phone]    Recent Visits  Date Type Provider Dept   01/13/25 Telephone Celia Lambert PA-C  [facility]   Showing recent visits within  past 7 days and meeting all other requirements  Today's Visits  Date Type Provider Dept   01/20/25 Telemedicine Celia Lambert PA-C [facility]   Showing today's visits and meeting all other requirements  Future Appointments  No visits were found meeting these conditions.  Showing future appointments within next 150 days and meeting all other requirements       The patient was identified by name and date of birth. Freya Leon was informed that this is a telemedicine visit and that the visit is being conducted through the Epic Embedded platform. She agrees to proceed. My office door was closed. No one else was in the room. She acknowledged consent and understanding of privacy and security of the video platform. The patient has agreed to participate and understands they can discontinue the visit at any time. Patient is aware this is a billable service.     MEDICATION MANAGEMENT NOTE        [facility]      Name and Date of Birth:  Freya Leon 64 y.o. 1960 MRN: [id number]    Date of Visit: January 20, 2025       Subjective        Freya Leon is a 64 y.o. female, visited for Medication Management and Follow-up, who was virtually seen and evaluated today at the [facility] outpatient clinic for follow-up and medication management. Completes psychiatric assessment without difficulty.     At previous outpatient psychiatric appointment with this writer, clonazepam was decreased to 1 mg at bedtime and she was maintained on lamotrigine, Vraylar and trazodone as needed.   She denies any current adverse medication side effects.      Freya overall appears brighter today, smiling appropriately.  Appears less anxious and less depressed.  States that she has been attending Taoist services and through her Taoist they also have bingo more regularly and social gatherings which she has been participating in.  States that  she has been struggling though with sleep at night.  States that she is sleeping only 5 to 6 hours and normally she needs 9 hours.  Clonazepam has not been helpful which we have been tapering her off.  She is in agreement with trial of different medication for sleep at night.  We will trial Belsomra due to its studies in age population of 65+ and possible cognitive benefits compared to clonazepam.    Physically states that she has continued with chronic pain.  Lower back and knee pain which is worse in cold weather.  This has been limiting her ability to go to the gym.  No new medications or other medical issues were noted.      Review Of Systems:  Pertinent items are noted in HPI; all others are negative; no recent changes in medications or health status reported.    PHQ-2/9 Depression Screening                 Historical Information        Past Medical History:    Past Medical History:   Diagnosis Date    Generalized anxiety disorder 10/29/2014        Past Surgical History:   Procedure Laterality Date    CT NEEDLE BIOPSY LUNG  2/8/2023     No Known Allergies    Substance Abuse History:    Social History     Substance and Sexual Activity   Alcohol Use No     Social History     Substance and Sexual Activity   Drug Use No       Social History:    Social History     Socioeconomic History    Marital status:      Spouse name: Not on file    Number of children: Not on file    Years of education: Not on file    Highest education level: Not on file   Occupational History    Not on file   Tobacco Use    Smoking status: Every Day     Current packs/day: 1.50     Types: Cigarettes    Smokeless tobacco: Never   Substance and Sexual Activity    Alcohol use: No    Drug use: No    Sexual activity: Not on file   Other Topics Concern    Not on file   Social History Narrative    Not on file     Social Drivers of Health     Financial Resource Strain: Low Risk  (2/19/2024)    [facility]  [facility]    Overall Financial Resource Strain (CARDIA)     Difficulty of Paying Living Expenses: Not very hard   Food Insecurity: No Food Insecurity (2/19/2024)    [facility]    Hunger Vital Sign     Worried About Running Out of Food in the Last Year: Never true     Ran Out of Food in the Last Year: Never true   Transportation Needs: No Transportation Needs (2/19/2024)    [facility]    PRAPARE - Transportation     Lack of Transportation (Medical): No     Lack of Transportation (Non-Medical): No   Physical Activity: Not on file   Stress: Not on file   Social Connections: Not on file   Intimate Partner Violence: Not At Risk (2/19/2024)    [facility]    Humiliation, Afraid, Rape, and Kick questionnaire     Fear of Current or Ex-Partner: No     Emotionally Abused: No     Physically Abused: No     Sexually Abused: No   Housing Stability: Low Risk  (2/19/2024)    [facility]    Housing Stability Vital Sign     Unable to Pay for Housing in the Last Year: No     Number of Places Lived in the Last Year: 1     Unstable Housing in the Last Year: No       Family Psychiatric History:     No family history on file.    History Review: The following portions of the patient's history were reviewed and updated as appropriate: allergies, current medications, past family history, past medical history, past social history, past surgical history and problem list.           Objective      Vital signs in last 24 hours:  There were no vitals filed for this visit.    Mental Status Evaluation:    Appearance age appropriate, casually dressed, dressed appropriately   Behavior cooperative, calm   Speech normal rate, normal volume, normal pitch   Mood less depressed   Affect brighter  "  Thought Processes organized, goal directed   Associations intact associations   Thought Content no overt delusions   Perceptual Disturbances: no auditory hallucinations, no visual hallucinations   Abnormal Thoughts  Risk Potential Suicidal ideation - None  Homicidal ideation - None  Potential for aggression - No   Orientation oriented to: person, place, time/date, and situation   Memory recent and remote memory grossly intact   Consciousness alert and awake   Attention Span Concentration Span attention span and concentration are age appropriate   Intellect Not formally assessed   Insight intact   Judgement intact   Muscle Strength and  Gait unable to assess today due to virtual visit   Motor activity unable to assess today due to virtual visit   Language no difficulty naming common objects   Fund of Knowledge adequate knowledge of current events   Pain mild   Pain Scale Mild pain     Laboratory Results: I have personally reviewed all pertinent laboratory/tests results  CBC: No results found for: \"WBC\", \"RBC\", \"HGB\", \"HCT\", \"MCV\", \"PLT\", \"ADJUSTEDWBC\", \"MCH\", \"MCHC\", \"RDW\", \"MPV\", \"NEUTROABS\", \"TOTANEUTABS\"            Current Outpatient Medications   Medication Sig Dispense Refill    clonazePAM (KlonoPIN) 1 mg tablet 1/2 po qhs x 4 nights then stop and cont 1/2 po qd prn basis only      Suvorexant (Belsomra) 10 MG TABS Take 1 tablet (10 mg total) by mouth daily at bedtime 30 tablet 1    atorvastatin (LIPITOR) 20 mg tablet Take 20 mg by mouth      benzonatate (TESSALON PERLES) 100 mg capsule Take 100 mg by mouth Three times daily as needed      cariprazine (Vraylar) 1.5 MG capsule Take 1 capsule (1.5 mg total) by mouth every morning 90 capsule 1    celecoxib (CeleBREX) 200 mg capsule TAKE ONE CAPSULE BY MOUTH TWICE A DAY      Cholecalciferol (VITAMIN D3) 1000 units CAPS TAKE 1 CAPSULE (50,000 UNITS TOTAL) BY MOUTH ONCE A WEEK FOR 12 DOSES.      Eliquis 5 MG 5 mg 2 (two) times a day      fenofibrate (FENOGLIDE) 120 " MG TABS Take 120 mg by mouth      lamoTRIgine (LaMICtal) 200 MG tablet TAKE 1 TABLET (200 MG TOTAL) BY MOUTH DAILY AT BEDTIME 90 tablet 1    LINZESS 145 MCG CAPS 1 (ONE) CAPSULE 30 MIN PRIOR TO MEAL  1    metoprolol succinate (TOPROL-XL) 25 mg 24 hr tablet Take 25 mg by mouth      pantoprazole (PROTONIX) 40 mg tablet Take by mouth      PROAIR RESPICLICK 108 (90 Base) MCG/ACT AEPB Inhale 2 puffs every 4 (four) hours as needed  3    Restasis 0.05 % ophthalmic emulsion INSTILL 1 DROP INTO BOTH EYES TWICE A DAY      traZODone (DESYREL) 100 mg tablet TAKE 1 TABLET (100 MG TOTAL) BY MOUTH DAILY AT BEDTIME AS NEEDED FOR SLEEP 90 tablet 0    Trelegy Ellipta 200-62.5-25 MCG/ACT AEPB inhaler        No current facility-administered medications for this visit.         Medications Risks/Benefits      Risks, Benefits And Possible Side Effects Of Medications:    Risks, benefits, and possible side effects of medications explained to Freya and she verbalizes understanding and agreement for treatment.    Controlled Medication Discussion:     Freya has been filling controlled prescriptions on time as prescribed according to [place] Prescription Drug Monitoring Program    Psychotherapy Provided:     Individual psychotherapy provided: No   Psychoeducation provided to the patient and was educated about the importance of compliance with the medications and psychiatric treatment  Supportive psychotherapy provided to the patient  Solution Focused Brief Therapy (SFBT) provided  Patient's emotions were validated and specific labeled praise provided.   [place] suggestions were offered in a supportive non-critical way.     Treatment Plan:    Completed and signed during the session: Not applicable - Treatment Plan not due at this session    Note Shared    Celia Lambert PA-C 01/20/25    This note was completed in part utilizing Dragon dictation Software. Grammatical, translation, syntax errors, random word insertions, spelling  mistakes, and incomplete sentences may be an occasional consequence of this system secondary to software limitations with voice recognition, ambient noise, and hardware issues. If you have any questions or concerns about the content, text, or information contained within the body of this dictation, please contact the provider for clarification.

## 2025-01-20 NOTE — ASSESSMENT & PLAN NOTE
Continue Vraylar 1.5 mg daily    Continue Lamictal 200 mg daily    Orders:    clonazePAM (KlonoPIN) 1 mg tablet; 1/2 po qhs x 4 nights then stop and cont 1/2 po qd prn basis only

## 2025-01-30 ENCOUNTER — TELEPHONE (OUTPATIENT)
Age: 65
End: 2025-01-30

## 2025-01-30 DIAGNOSIS — F51.04 PSYCHOPHYSIOLOGICAL INSOMNIA: ICD-10-CM

## 2025-01-30 RX ORDER — SUVOREXANT 15 MG/1
15 TABLET, FILM COATED ORAL
Qty: 30 TABLET | Refills: 1 | Status: SHIPPED | OUTPATIENT
Start: 2025-01-30

## 2025-01-30 NOTE — TELEPHONE ENCOUNTER
The patient contacted the office, requesting to speak with her provider regarding some medication concerns. The patient explained that she just started taking Belsomra 10 mg tabs. But the medication is not helping her sleep. She would take it at 10 pm, and by 2 am she is still awake. The patient noticed some side effect to the medication, she has been feeling foggy in the morning which is usually follow with a bad headache.     Please Review. Thank you.

## 2025-01-31 NOTE — TELEPHONE ENCOUNTER
Called Freya ([phone]) and left VM requesting a call back to discuss. Upon return call will advise she increase her Belsomra to 15 mg at night and I've us an update in a couple of weeks. If not effective at that time we can increase it again.

## 2025-02-03 NOTE — TELEPHONE ENCOUNTER
Took a call from Freya. She did get the message about the increase, but said her message didn't get across right. She doesn't feel that it's not strong enough, rather it doesn't agree with her and she's having side effects. The first 3 days she had night sweats (which did subside after 3 days), she's waking with a headache and nausea and she has difficulty focusing. She didn't feel she could drive yesterday morning and missed [place].     Freya continued taking 10 mg as she wouldn't stop her medication without Silvana's permission. Best number for Freya:  [phone].

## 2025-02-19 DIAGNOSIS — F41.1 GENERALIZED ANXIETY DISORDER: ICD-10-CM

## 2025-02-19 DIAGNOSIS — F31.32 BIPOLAR AFFECTIVE DISORDER, CURRENTLY DEPRESSED, MODERATE (HCC): ICD-10-CM

## 2025-02-20 DIAGNOSIS — F41.1 GENERALIZED ANXIETY DISORDER: ICD-10-CM

## 2025-02-20 DIAGNOSIS — F51.04 PSYCHOPHYSIOLOGICAL INSOMNIA: ICD-10-CM

## 2025-02-20 DIAGNOSIS — F31.32 BIPOLAR AFFECTIVE DISORDER, CURRENTLY DEPRESSED, MODERATE (HCC): ICD-10-CM

## 2025-02-20 RX ORDER — CLONAZEPAM 1 MG/1
TABLET ORAL
Qty: 30 TABLET | Refills: 0 | Status: SHIPPED | OUTPATIENT
Start: 2025-02-20

## 2025-02-20 RX ORDER — TRAZODONE HYDROCHLORIDE 100 MG/1
100 TABLET ORAL
Qty: 90 TABLET | Refills: 0 | Status: SHIPPED | OUTPATIENT
Start: 2025-02-20

## 2025-02-20 RX ORDER — CLONAZEPAM 1 MG/1
TABLET ORAL
Qty: 270 TABLET | OUTPATIENT
Start: 2025-02-20

## 2025-02-20 NOTE — TELEPHONE ENCOUNTER
Refill cannot be delegated    1 [id number] 01/21/2025 01/20/2025 Belsomra (Tablet) 30.0 30 10 MG [facility] [place] / 1 [place]   1 [id number] 10/22/2024 10/09/2024 clonazePAM (Tablet) 135.0 90 1 MG [facility] [place]   1 [id number] 07/22/2024 06/12/2024 clonazePAM (Tablet) 270.0 90 1 MG NA [facility] [place]   1 [id number] 04/22/2024 02/08/2024 clonazePAM (Tablet) 270.0 90 1 MG [facility] [place] / [place]

## 2025-02-24 DIAGNOSIS — F41.1 GENERALIZED ANXIETY DISORDER: ICD-10-CM

## 2025-02-24 DIAGNOSIS — F31.32 BIPOLAR AFFECTIVE DISORDER, CURRENTLY DEPRESSED, MODERATE (HCC): ICD-10-CM

## 2025-02-24 RX ORDER — CLONAZEPAM 1 MG/1
TABLET ORAL
Qty: 45 TABLET | Refills: 0 | Status: SHIPPED | OUTPATIENT
Start: 2025-02-24

## 2025-02-24 NOTE — TELEPHONE ENCOUNTER
Medication Refill Request     Name of Medication Klonopin   Dose/Frequency 1 mg  Quantity 30  Verified pharmacy   [x]  Verified ordering Provider   [x]  Does patient have enough for the next 3 days? Yes [x] No []  Does patient have a follow-up appointment scheduled? Yes [x] No []   If so when is appointment: 3/17/2025 at 1200 pm via virtual.

## 2025-02-24 NOTE — TELEPHONE ENCOUNTER
02/20/2025 02/20/2025 clonazePAM (Tablet) 15.0 30 1 MG [facility] [place]   1 [id number] 01/21/2025 01/20/2025 Belsomra (Tablet) 30.0 30 10 MG [facility] [place]   1 [id number] 10/22/2024 10/09/2024 clonazePAM (Tablet) 135.0 90 1 MG [facility] [place]   1 [id number] 07/22/2024 06/12/2024 clonazePAM (Tablet) 270.0 90 1 MG [facility] [place]   1 [id number] 04/22/2024 02/08/2024 clonazePAM (Tablet) 270.0 90 1 MG

## 2025-02-25 ENCOUNTER — TELEPHONE (OUTPATIENT)
Age: 65
End: 2025-02-25

## 2025-02-25 NOTE — TELEPHONE ENCOUNTER
Called Freya diaz and GRAZYNA on her VM informing her that provider attempted to call her back earlier today and LM on her VM regarding Klonopin and Belsomra.  She should be taking Klonopin 1 MG 1/2 tablet daily as needed for panic attack and Belsomra 1 tablet at bedtime as needed for insomnia.  Informed her that she should not take both medications together.  Requested she call the office for further discussion.

## 2025-02-25 NOTE — TELEPHONE ENCOUNTER
Leticia, from [facility] called in regard to medication inquiry. Patient stated to them that she was weaning off of Clonazepam 1mg medication and the provider switched her to Belsomra but patient stated that medication was not working well for her sleep.   Leticia stated that another Belsomra 15mg script was sent in for refill. Now a Clonazepam 1mg prescription was sent in yesterday for only 1/2 pill. Pharmacy is inquiring if patient really understands her medication protocol and would like to clarify with provider if patient spoke to provider about the medications recently. Pharmacy would like a call back to clarify.   Leticia [phone]

## 2025-02-27 NOTE — TELEPHONE ENCOUNTER
Called Freya and left detailed VM reviewing Silvana's message:  She may continue trazodone and take half a tablet of Klonopin.  I did put in a prescription for Klonopin 1 mg a half a tablet once a day as needed for 45 tablets which is a 90-day supply if she takes a half a tablet.   --------------  Requested she call us back if she has any questions.

## 2025-02-27 NOTE — TELEPHONE ENCOUNTER
Patient calling requesting to speak to a nurse about her medications. Transferred to clinical team

## 2025-02-27 NOTE — TELEPHONE ENCOUNTER
"Spoke with Freya (ok to leave detailed VM) - she said Belsomra is \"an evil evil drug.\" It kept her awake all night. She said then provider told her she take trazodone which confused her because she was taking it all along. Then she said provider told her to take belsomra again. She took it about 9 days but doesn't like it. She said \"it is making me crazy, literally, because of no sleep. Every single day I wake up nauseous with a headache. My pharmacist told me to lay in bed for a few minutes but it doesn't go away.\" She said it is unbelievable. She said trazodone alone isn't effective, but is effective if she takes a klonopin with it. She said she will do what Silvana recommends but doesn't want to try a new pill right now. She also said klonopin was ordered but not for a 90 day supply. 90 day supply is cheaper for her. Forwarding to provider for review. Clinical will follow up as advised.   "

## 2025-03-17 ENCOUNTER — TELEMEDICINE (OUTPATIENT)
Dept: PSYCHIATRY | Facility: CLINIC | Age: 65
End: 2025-03-17
Payer: COMMERCIAL

## 2025-03-17 DIAGNOSIS — F41.1 GENERALIZED ANXIETY DISORDER: ICD-10-CM

## 2025-03-17 DIAGNOSIS — F51.04 PSYCHOPHYSIOLOGICAL INSOMNIA: ICD-10-CM

## 2025-03-17 DIAGNOSIS — F31.32 BIPOLAR AFFECTIVE DISORDER, CURRENTLY DEPRESSED, MODERATE (HCC): Primary | ICD-10-CM

## 2025-03-17 PROCEDURE — 99214 OFFICE O/P EST MOD 30 MIN: CPT | Performed by: PHYSICIAN ASSISTANT

## 2025-03-17 RX ORDER — CLONAZEPAM 1 MG/1
TABLET ORAL
Qty: 45 TABLET | Refills: 1 | Status: SHIPPED | OUTPATIENT
Start: 2025-03-17

## 2025-03-17 NOTE — ASSESSMENT & PLAN NOTE
Not at baseline  Minimal benefit noted with Vraylar and concerns of this contributing to insomnia  We will discontinue Vraylar  Continue clonazepam but decrease to 1.5 mg at bedtime as needed  Continue Lamictal 200 mg daily    Orders:    clonazePAM (KlonoPIN) 1 mg tablet; 1 and 1/2 po qhs prn

## 2025-03-17 NOTE — ASSESSMENT & PLAN NOTE
Clonazepam 1 mg 1-1/2 at night as needed  Orders:    clonazePAM (KlonoPIN) 1 mg tablet; 1 and 1/2 po qhs prn

## 2025-03-17 NOTE — PSYCH
Virtual Regular Visit    Patient is located at Home in the following state in which I hold an active license [place].    Assessment & Plan  Bipolar affective disorder, currently depressed, moderate (HCC)  Not at baseline  Minimal benefit noted with Vraylar and concerns of this contributing to insomnia  We will discontinue Vraylar  Continue clonazepam but decrease to 1.5 mg at bedtime as needed  Continue Lamictal 200 mg daily    Orders:    clonazePAM (KlonoPIN) 1 mg tablet; 1 and 1/2 po qhs prn    Generalized anxiety disorder  Clonazepam 1 mg 1-1/2 at night as needed  Orders:    clonazePAM (KlonoPIN) 1 mg tablet; 1 and 1/2 po qhs prn    Psychophysiological insomnia  Clonazepam 1 mg 1-1/2 at night as needed  Trazodone 100 mg at night          Follow-up in 1 to 2 months and she will call me sooner if any questions or concerns       Reason for visit is   Chief Complaint   Patient presents with    Follow-up    Medication Management        Visit Time  Visit Start Time: 1200  Visit Stop Time: 1230  Total Visit Duration: 30 minutes    Encounter provider: Celia Lambert PA-C  Provider located at [address] [address] [phone]    Recent Visits  No visits were found meeting these conditions.  Showing recent visits within past 7 days and meeting all other requirements  Today's Visits  Date Type Provider Dept   03/17/25 Telemedicine Celia Lambert PA-C  [facility]   Showing today's visits and meeting all other requirements  Future Appointments  No visits were found meeting these conditions.  Showing future appointments within next 150 days and meeting all other requirements       The patient was identified by name and date of birth. Freya Leon was informed that this is a telemedicine visit and that the visit is being conducted through the Epic Embedded platform. She agrees to proceed. My office door  was closed. No one else was in the room. She acknowledged consent and understanding of privacy and security of the video platform. The patient has agreed to participate and understands they can discontinue the visit at any time. Patient is aware this is a billable service.     MEDICATION MANAGEMENT NOTE        [facility]      Name and Date of Birth:  Freya Leon 64 y.o. 1960 MRN: [id number]    Date of Visit: March 17, 2025         - RTC in 2 months  - The patient was educated about 24 hour and weekend coverage for urgent situations accessed by calling [facility] main practice number/ [phone] crisis line         Subjective        Freya Leon is a 64 y.o. female, visited for Follow-up and Medication Management, who was virtually seen and evaluated today at the [facility] outpatient clinic for follow-up and medication management. Completes psychiatric assessment without difficulty.     At previous outpatient psychiatric appointment with this writer, maintained on Lamictal 200 mg daily, started on Belsomra in place of Klonopin for sleep.        Freya states that she was unable to tolerate belsomra and had actually made her insomnia worse.  States that she was struggling and had called and was frustrated with not being able to speak directly to me.  Reviewed phone messages and discussed that I had called her back but received her voicemail.    She states in the meantime she went back to taking Klonopin and started taking 2 mg at night.    Discussed concerns for this dosage with other medications and she is in agreement with reducing to 1.5 mg.    Will continue trazodone 100 mg at night and will discontinue Vraylar.    She has been on Vraylar for about 1 year and no significant improvement noted in her mood and also concerns of this potentially exacerbating her insomnia  Physically states that she has continued to  have mouth pain.  She had a root canal last week and also had a dental infection prior to that.  Medication list reviewed and updated.      Review Of Systems:  Pertinent items are noted in HPI; all others are negative; no recent changes in medications or health status reported.    PHQ-2/9 Depression Screening             MARNI-7 Flowsheet Screening      Flowsheet Row Most Recent Value   Over the last two weeks, how often have you been bothered by the following problems?     Feeling nervous, anxious, or on edge 1    Not being able to stop or control worrying 2    Worrying too much about different things 2    Trouble relaxing  2    Being so restless that it's hard to sit still 0    Becoming easily annoyed or irritable  0    Feeling afraid as if something awful might happen 0    How difficult have these problems made it for you to do your work, take care of things at home, or get along with other people?  Somewhat difficult    MARNI Score  7             Historical Information        Past Medical History:    Past Medical History:   Diagnosis Date    Generalized anxiety disorder 10/29/2014        Past Surgical History:   Procedure Laterality Date    CT NEEDLE BIOPSY LUNG  2/8/2023     No Known Allergies    Substance Abuse History:    Social History     Substance and Sexual Activity   Alcohol Use No     Social History     Substance and Sexual Activity   Drug Use No       Social History:    Social History     Socioeconomic History    Marital status:      Spouse name: Not on file    Number of children: Not on file    Years of education: Not on file    Highest education level: Not on file   Occupational History    Not on file   Tobacco Use    Smoking status: Every Day     Current packs/day: 1.50     Types: Cigarettes    Smokeless tobacco: Never   Substance and Sexual Activity    Alcohol use: No    Drug use: No    Sexual activity: Not on file   Other Topics Concern    Not on file   Social History Narrative    Not on file      Social Drivers of Health     Financial Resource Strain: Not At Risk (2/18/2025)    Received from [facility]    Financial Insecurity     In the last 12 months did you skip medications to save money?: No     In the last 12 months was there a time when you needed to see a doctor but could not because of cost?: No   Food Insecurity: No Food Insecurity (2/18/2025)    Received from [facility]    Food Insecurity     In the last 12 months did you ever eat less than you felt you should because there wasn't enough money for food?: No   Transportation Needs: No Transportation Needs (2/18/2025)    Received from [facility]    Transportation Needs     In the last 12 months have you ever had to go without healthcare because you didn't have a way to get there?: No   Physical Activity: Not on file   Stress: Not on file   Social Connections: Socially Isolated (2/18/2025)    Received from [facility]    Social Connection     Do you often feel lonely?: Yes   Intimate Partner Violence: Not At Risk (2/19/2024)    [facility], [facility]    Humiliation, Afraid, Rape, and Kick questionnaire     Fear of Current or Ex-Partner: No     Emotionally Abused: No     Physically Abused: No     Sexually Abused: No   Housing Stability: Not At Risk (2/18/2025)    Received from [facility]    Housing Stability     Are you worried that in the next 2 months you may not have stable housing?: No       Family Psychiatric History:     History reviewed. No pertinent family history.    History Review: The following portions of the patient's history were reviewed and updated as appropriate: allergies, current medications, past family history, past medical history, past social history, past surgical history and problem list.           Objective      Vital signs in last 24 hours:  There were no  vitals filed for this visit.    Mental Status Evaluation:    Appearance age appropriate, casually dressed   Behavior cooperative   Speech normal rate, normal volume, normal pitch   Mood dysphoric, anxious   Affect normal range and intensity, appropriate   Thought Processes organized, goal directed   Associations circumstantial associations   Thought Content no overt delusions   Perceptual Disturbances: no auditory hallucinations, no visual hallucinations   Abnormal Thoughts  Risk Potential Suicidal ideation - None  Homicidal ideation - None  Potential for aggression - No   Orientation oriented to: person, place, time/date, and situation   Memory recent and remote memory grossly intact   Consciousness alert and awake   Attention Span Concentration Span attention span and concentration are age appropriate   Intellect Not Formally assessed   Insight fair   Judgement fair   Muscle Strength and  Gait unable to assess today due to virtual visit   Motor activity no abnormal movements   Language no difficulty naming common objects   Fund of Knowledge adequate knowledge of current events   Pain severe   Pain Scale Tooth pain     Laboratory Results: I have personally reviewed all pertinent laboratory/tests results  CMP:   Lab Results   Component Value Date    SODIUM 138 02/11/2025    K 4.7 02/11/2025     02/11/2025    CO2 25 02/11/2025    AGAP 10 02/11/2025    BUN 18 02/11/2025    CREATININE 0.76 02/11/2025    GLUC 128 (H) 02/11/2025    CALCIUM 9 02/11/2025    AST 14 02/11/2025    ALT 10 02/11/2025    ALKPHOS 50 02/11/2025    TP 6.4 02/11/2025    ALB 4.2 02/11/2025    TBILI 0.3 02/11/2025    EGFR 86 02/11/2025               Current Outpatient Medications   Medication Sig Dispense Refill    clonazePAM (KlonoPIN) 1 mg tablet 1 and 1/2 po qhs prn 45 tablet 1    atorvastatin (LIPITOR) 20 mg tablet Take 20 mg by mouth      benzonatate (TESSALON PERLES) 100 mg capsule Take 100 mg by mouth Three times daily as needed       celecoxib (CeleBREX) 200 mg capsule TAKE ONE CAPSULE BY MOUTH TWICE A DAY      Cholecalciferol (VITAMIN D3) 1000 units CAPS TAKE 1 CAPSULE (50,000 UNITS TOTAL) BY MOUTH ONCE A WEEK FOR 12 DOSES.      Eliquis 5 MG 5 mg 2 (two) times a day      fenofibrate (FENOGLIDE) 120 MG TABS Take 120 mg by mouth      lamoTRIgine (LaMICtal) 200 MG tablet TAKE 1 TABLET (200 MG TOTAL) BY MOUTH DAILY AT BEDTIME 90 tablet 1    LINZESS 145 MCG CAPS 1 (ONE) CAPSULE 30 MIN PRIOR TO MEAL  1    metoprolol succinate (TOPROL-XL) 25 mg 24 hr tablet Take 25 mg by mouth      pantoprazole (PROTONIX) 40 mg tablet Take by mouth      PROAIR RESPICLICK 108 (90 Base) MCG/ACT AEPB Inhale 2 puffs every 4 (four) hours as needed  3    Restasis 0.05 % ophthalmic emulsion INSTILL 1 DROP INTO BOTH EYES TWICE A DAY      traZODone (DESYREL) 100 mg tablet TAKE 1 TABLET (100 MG TOTAL) BY MOUTH DAILY AT BEDTIME AS NEEDED FOR SLEEP 90 tablet 0    Trelegy Ellipta 200-62.5-25 MCG/ACT AEPB inhaler        No current facility-administered medications for this visit.         Medications Risks/Benefits      Risks, Benefits And Possible Side Effects Of Medications:    Risks, benefits, and possible side effects of medications explained to Freya including risks of misuse, abuse or dependence, sedation and respiratory depression related to treatment with benzodiazepine medications. She verbalizes understanding and agreement for treatment.    Controlled Medication Discussion:     Freya has been filling controlled prescriptions on time as prescribed according to [place] Prescription Drug Monitoring Program    Psychotherapy Provided:     Individual psychotherapy provided: No   Psychoeducation provided to the patient and was educated about the importance of compliance with the medications and psychiatric treatment  Supportive psychotherapy provided to the patient    Treatment Plan:   Not applicable - Treatment Plan not due at this session    Note Share    This note was  not shared with the patient due to reasonable likelihood of causing patient harm    Celia Lambert PA-C 03/17/25    This note was completed in part utilizing Dragon dictation Software. Grammatical, translation, syntax errors, random word insertions, spelling mistakes, and incomplete sentences may be an occasional consequence of this system secondary to software limitations with voice recognition, ambient noise, and hardware issues. If you have any questions or concerns about the content, text, or information contained within the body of this dictation, please contact the provider for clarification.

## 2025-05-07 ENCOUNTER — TELEPHONE (OUTPATIENT)
Dept: PSYCHIATRY | Facility: CLINIC | Age: 65
End: 2025-05-07

## 2025-05-07 NOTE — TELEPHONE ENCOUNTER
Writer called and left a message notifying the patient that due to a change on the providers schedule the appt scheduled for 5/9 was cancelled.  A call back was requested to reschedule.

## 2025-05-07 NOTE — TELEPHONE ENCOUNTER
Patient contacted the office to schedule a follow up visit with provider. Patient is now scheduled for 5/28  at 2 virtually.

## 2025-05-28 ENCOUNTER — TELEMEDICINE (OUTPATIENT)
Dept: PSYCHIATRY | Facility: CLINIC | Age: 65
End: 2025-05-28
Payer: COMMERCIAL

## 2025-05-28 ENCOUNTER — TELEPHONE (OUTPATIENT)
Dept: PSYCHIATRY | Facility: CLINIC | Age: 65
End: 2025-05-28

## 2025-05-28 DIAGNOSIS — F31.32 BIPOLAR AFFECTIVE DISORDER, CURRENTLY DEPRESSED, MODERATE (HCC): ICD-10-CM

## 2025-05-28 DIAGNOSIS — F51.04 PSYCHOPHYSIOLOGICAL INSOMNIA: ICD-10-CM

## 2025-05-28 DIAGNOSIS — F41.1 GENERALIZED ANXIETY DISORDER: ICD-10-CM

## 2025-05-28 PROCEDURE — 99214 OFFICE O/P EST MOD 30 MIN: CPT | Performed by: PHYSICIAN ASSISTANT

## 2025-05-28 RX ORDER — NITROGLYCERIN 0.4 MG/1
0.4 TABLET SUBLINGUAL
COMMUNITY
Start: 2025-04-09

## 2025-05-28 RX ORDER — CLONAZEPAM 1 MG/1
TABLET ORAL
Qty: 180 TABLET | Refills: 1 | Status: SHIPPED | OUTPATIENT
Start: 2025-05-28

## 2025-05-28 RX ORDER — TRAZODONE HYDROCHLORIDE 100 MG/1
100 TABLET ORAL
Qty: 90 TABLET | Refills: 1 | Status: SHIPPED | OUTPATIENT
Start: 2025-05-28

## 2025-05-28 RX ORDER — LAMOTRIGINE 200 MG/1
200 TABLET ORAL
Qty: 90 TABLET | Refills: 1 | Status: SHIPPED | OUTPATIENT
Start: 2025-05-28

## 2025-05-28 NOTE — BH TREATMENT PLAN
"TREATMENT PLAN (Medication Management Only)        [facility]    Name and Date of Birth:  Freya Leon 65 y.o. 1960  MRN: [id number]  Date of Treatment Plan: May 28, 2025  Diagnosis/Diagnoses:    No diagnosis found.  Strengths/Personal Resources for Self-Care: \"son, Sabianist friends\".  Area/Areas of need (in own words): \"continue to work on physical activity\"  1. Long Term Goal:   improve control of anxiety.  Target Date:6 months - 11/28/2025  Person/Persons responsible for completion of goal: Freya  2.  Short Term Objective (s) - How will we reach this goal?:   A.  Provider new recommended medication/dosage changes and/or continue medication(s): klonopin one half at night and a half during the day as needed, continue lamotrigine 200 mg daily and trazodone 100 mg at bedtime .  B.  N/A.  C.  N/A.  Target Date:6 months - 11/28/2025  Person/Persons Responsible for Completion of Goal: Freya  Progress Towards Goals: continuing treatment  Treatment Modality: medication management every 3 months  Review due 180 days from date of this plan: 6 months - 11/28/2025  Expected length of service: maintenance unless revised  My Physician/PA/NP and I have developed this plan together and I agree to work on the goals and objectives. I understand the treatment goals that were developed for my treatment.   Electronic Signatures: on file (unless signed below)    Celia Lambert PA-C 05/28/25  "

## 2025-05-28 NOTE — ASSESSMENT & PLAN NOTE
Continue lamotrigine 200 mg daily  Clonazepam 1 mg 1-1/2 at night and a half try the day as needed  Orders:    clonazePAM (KlonoPIN) 1 mg tablet; 1 and 1/2 po qhs and 1/2 po qd prn

## 2025-05-28 NOTE — ASSESSMENT & PLAN NOTE
Continues with residual anxiety  Continue clonazepam 1-1/2 at night and authorize a half during the day as needed  Continue lamotrigine  Orders:    lamoTRIgine (LaMICtal) 200 MG tablet; Take 1 tablet (200 mg total) by mouth daily at bedtime    clonazePAM (KlonoPIN) 1 mg tablet; 1 and 1/2 po qhs and 1/2 po qd prn

## 2025-05-28 NOTE — PSYCH
Virtual Regular Visit    Patient is located at Home in the following state in which I hold an active license [place].    Assessment & Plan  Psychophysiological insomnia  Improved with trazodone and clonazepam  Orders:    traZODone (DESYREL) 100 mg tablet; Take 1 tablet (100 mg total) by mouth daily at bedtime as needed for sleep    Generalized anxiety disorder  Continues with residual anxiety  Continue clonazepam 1-1/2 at night and authorize a half during the day as needed  Continue lamotrigine  Orders:    lamoTRIgine (LaMICtal) 200 MG tablet; Take 1 tablet (200 mg total) by mouth daily at bedtime    clonazePAM (KlonoPIN) 1 mg tablet; 1 and 1/2 po qhs and 1/2 po qd prn    Bipolar affective disorder, currently depressed, moderate (HCC)  Continue lamotrigine 200 mg daily  Clonazepam 1 mg 1-1/2 at night and a half try the day as needed  Orders:    clonazePAM (KlonoPIN) 1 mg tablet; 1 and 1/2 po qhs and 1/2 po qd prn       Follow-up in 3 months and she will call me sooner if any questions or concerns       Reason for visit is   Chief Complaint   Patient presents with    Follow-up    Medication Management        Visit Time  Visit Start Time: 150  Visit Stop Time: 225  Total Visit Duration: 35 minutes    Encounter provider: Celia Lambert PA-C  Provider located at [address] [place] [address] [phone]    Recent Visits  No visits were found meeting these conditions.  Showing recent visits within past 7 days and meeting all other requirements  Today's Visits  Date Type Provider Dept   05/28/25 Telemedicine Celia Lambert PA-C  [facility]   Showing today's visits and meeting all other requirements  Future Appointments  No visits were found meeting these conditions.  Showing future appointments within next 150 days and meeting all other requirements     Administrative Statements   Encounter provider Celia  Christine Lambert PA-C    The Patient is located at Home and in the following state in which I hold an active license [place].    The patient was identified by name and date of birth. Freya Leon was informed that this is a telemedicine visit and that the visit is being conducted through the Epic Embedded platform. She agrees to proceed..  My office door was closed. No one else was in the room.  She acknowledged consent and understanding of privacy and security of the video platform. The patient has agreed to participate and understands they can discontinue the visit at any time.    I have spent a total time of 30 minutes in caring for this patient on the day of the visit/encounter including Risks and benefits of tx options, Instructions for management, Patient and family education, Documenting in the medical record, Reviewing/placing orders in the medical record (including tests, medications, and/or procedures), and Obtaining or reviewing history  , not including the time spent for establishing the audio/video connection.    MEDICATION MANAGEMENT NOTE        [facility]      Name and Date of Birth:  Freya Leon 65 y.o. 1960 MRN: [id number]    Date of Visit: May 28, 2025              Subjective        Freya Leon is a 65 y.o. female, visited for Follow-up and Medication Management, who was virtually seen and evaluated today at the [facility] outpatient clinic for follow-up and medication management. Completes psychiatric assessment without difficulty.     At previous outpatient psychiatric appointment with this writer, Vraylar was discontinued and she was continued on lamotrigine, clonazepam 1.5 mg nightly as needed, trazodone 100 mg at at bedtime.   She denies any current adverse medication side effects.      Freya states that with taking trazodone and clonazepam at night she has been sleeping about 8 hours.  States that she ran out of  Klonopin though for the past 2 nights and has not slept.  Also states that she has episodic anxiety during the day.  At her last visit Vraylar was discontinued and states that overall since discontinuing and that she has been doing okay.  States that her anxiety typically is manageable with her medications however her father passed away as well as her aunt and she had an increase in her panic attacks.  Discussed medications.  States that only thing that has been helpful has been Klonopin.  We have been trying to taper the clonazepam however this has been ineffective and detrimental to her anxiety.  She is currently taking 1.5 mg of Klonopin at night and discussed that we will authorize half tablet during the day as needed.  States that she also been has having difficulty cutting the clonazepam since it does not score so sometimes she is losing part of her pills.  Discussed will increase dosage to up to 2 tablets/day which she is in agreement with.  She denies any suicidal or homicidal ideation.  States that her son's friend will be staying with her during the week since she does not have another place to stay.  Discussed concerns about this.  Medication list reviewed and updated.          Review Of Systems:  Pertinent items are noted in HPI; all others are negative; no recent changes in medications or health status reported.    PHQ-2/9 Depression Screening             MARNI-7 Flowsheet Screening      Flowsheet Row Most Recent Value   Over the last two weeks, how often have you been bothered by the following problems?     Feeling nervous, anxious, or on edge 1    Not being able to stop or control worrying 1    Worrying too much about different things 1    Trouble relaxing  1    Being so restless that it's hard to sit still 0    Becoming easily annoyed or irritable  0    Feeling afraid as if something awful might happen 1    How difficult have these problems made it for you to do your work, take care of things at home,  or get along with other people?  Somewhat difficult    MARNI Score  5             Historical Information        Past Medical History:    Past Medical History[1]     Past Surgical History[2]  Allergies[3]    Substance Abuse History:    Social History     Substance and Sexual Activity   Alcohol Use No     Social History     Substance and Sexual Activity   Drug Use No       Social History:    Social History     Socioeconomic History    Marital status:      Spouse name: Not on file    Number of children: Not on file    Years of education: Not on file    Highest education level: Not on file   Occupational History    Not on file   Tobacco Use    Smoking status: Every Day     Current packs/day: 1.50     Types: Cigarettes    Smokeless tobacco: Never   Substance and Sexual Activity    Alcohol use: No    Drug use: No    Sexual activity: Not on file   Other Topics Concern    Not on file   Social History Narrative    Not on file     Social Drivers of Health     Financial Resource Strain: Not At Risk (2/18/2025)    Received from [facility]    Financial Insecurity     In the last 12 months did you skip medications to save money?: No     In the last 12 months was there a time when you needed to see a doctor but could not because of cost?: No   Food Insecurity: No Food Insecurity (2/18/2025)    Received from [facility]    Food Insecurity     In the last 12 months did you ever eat less than you felt you should because there wasn't enough money for food?: No   Transportation Needs: No Transportation Needs (2/18/2025)    Received from [facility]    Transportation Needs     In the last 12 months have you ever had to go without healthcare because you didn't have a way to get there?: No   Physical Activity: Not on file   Stress: Not on file   Social Connections: Socially Isolated (2/18/2025)    Received from [facility]    Social Connection     Do you often feel  lonely?: Yes   Intimate Partner Violence: Not At Risk (2/19/2024)    Received from [facility]    Humiliation, Afraid, Rape, and Kick questionnaire     Within the last year, have you been afraid of your partner or ex-partner?: No     Within the last year, have you been humiliated or emotionally abused in other ways by your partner or ex-partner?: No     Within the last year, have you been kicked, hit, slapped, or otherwise physically hurt by your partner or ex-partner?: No     Within the last year, have you been raped or forced to have any kind of sexual activity by your partner or ex-partner?: No   Housing Stability: Not At Risk (2/18/2025)    Received from [facility]    Housing Stability     Are you worried that in the next 2 months you may not have stable housing?: No       Family Psychiatric History:     Family History[4]    History Review: The following portions of the patient's history were reviewed and updated as appropriate: allergies, current medications, past family history, past medical history, past social history, past surgical history and problem list.           Objective      Vital signs in last 24 hours:  There were no vitals filed for this visit.    Mental Status Evaluation:    Appearance age appropriate, casually dressed, dressed appropriately   Behavior cooperative, calm   Speech normal rate, normal volume, normal pitch   Mood dysphoric   Affect constricted   Thought Processes organized, goal directed   Associations intact associations   Thought Content no overt delusions   Perceptual Disturbances: no auditory hallucinations, no visual hallucinations   Abnormal Thoughts  Risk Potential Suicidal ideation - None  Homicidal ideation - None  Potential for aggression - No   Orientation oriented to: person, place, time/date, and situation   Memory recent and remote memory grossly intact   Consciousness alert and awake   Attention Span Concentration Span attention span  "and concentration are age appropriate   Intellect appears to be of average intelligence   Insight intact   Judgement intact   Muscle Strength and  Gait unable to assess today due to virtual visit   Motor activity unable to assess today due to virtual visit   Language no difficulty naming common objects   Fund of Knowledge adequate knowledge of current events   Pain none   Pain Scale 0     Laboratory Results: I have personally reviewed all pertinent laboratory/tests results  CBC: No results found for: \"WBC\", \"RBC\", \"HGB\", \"HCT\", \"MCV\", \"PLT\", \"ADJUSTEDWBC\", \"MCH\", \"MCHC\", \"RDW\", \"MPV\", \"NEUTROABS\", \"TOTANEUTABS\"  CMP:   Lab Results   Component Value Date    SODIUM 139 05/02/2025    K 4.3 05/02/2025     05/02/2025    CO2 28 05/02/2025    AGAP 10 05/02/2025    BUN 20 05/02/2025    CREATININE 1.01 05/02/2025    GLUC 196 (H) 05/02/2025    CALCIUM 10 05/02/2025    AST 10 05/02/2025    ALT 14 05/02/2025    ALKPHOS 55 05/02/2025    TP 6.7 05/02/2025    ALB 4.6 05/02/2025    TBILI 0.4 05/02/2025    EGFR 61 05/02/2025               Current Outpatient Medications   Medication Sig Dispense Refill    clonazePAM (KlonoPIN) 1 mg tablet 1 and 1/2 po qhs and 1/2 po qd prn 180 tablet 1    lamoTRIgine (LaMICtal) 200 MG tablet Take 1 tablet (200 mg total) by mouth daily at bedtime 90 tablet 1    nitroglycerin (NITROSTAT) 0.4 mg SL tablet Place 0.4 mg under the tongue      traZODone (DESYREL) 100 mg tablet Take 1 tablet (100 mg total) by mouth daily at bedtime as needed for sleep 90 tablet 1    atorvastatin (LIPITOR) 20 mg tablet Take 20 mg by mouth      benzonatate (TESSALON PERLES) 100 mg capsule Take 100 mg by mouth Three times daily as needed      celecoxib (CeleBREX) 200 mg capsule TAKE ONE CAPSULE BY MOUTH TWICE A DAY      Cholecalciferol (VITAMIN D3) 1000 units CAPS TAKE 1 CAPSULE (50,000 UNITS TOTAL) BY MOUTH ONCE A WEEK FOR 12 DOSES.      Eliquis 5 MG 5 mg 2 (two) times a day      fenofibrate (FENOGLIDE) 120 MG TABS Take " 120 mg by mouth      LINZESS 145 MCG CAPS 1 (ONE) CAPSULE 30 MIN PRIOR TO MEAL  1    metoprolol succinate (TOPROL-XL) 25 mg 24 hr tablet Take 25 mg by mouth (Patient not taking: Reported on 5/28/2025)      pantoprazole (PROTONIX) 40 mg tablet Take by mouth      PROAIR RESPICLICK 108 (90 Base) MCG/ACT AEPB Inhale 2 puffs every 4 (four) hours as needed  3    Restasis 0.05 % ophthalmic emulsion INSTILL 1 DROP INTO BOTH EYES TWICE A DAY      Ozzieleronel Ellipta 200-62.5-25 MCG/ACT AEPB inhaler        No current facility-administered medications for this visit.         Medications Risks/Benefits      Risks, Benefits And Possible Side Effects Of Medications:    Risks, benefits, and possible side effects of medications explained to Freya including risks of misuse, abuse or dependence, sedation and respiratory depression related to treatment with benzodiazepine medications. She verbalizes understanding and agreement for treatment.    Controlled Medication Discussion:     Freya has been filling controlled prescriptions on time as prescribed according to [place] Prescription Drug Monitoring Program    Psychotherapy Provided:     Individual psychotherapy provided: No       Treatment Plan:    Completed and signed during the session: Yes - Treatment Plan done but not signed at time of office visit due to:  Plan reviewed by video and verbal consent given due to virtual visit. Treatment Plan sent to patient via [employer] for signature.    Note Share    This note was not shared with the patient due to reasonable likelihood of causing patient harm    Celia Lambert PA-C 05/28/25    This note was completed in part utilizing Dragon dictation Software. Grammatical, translation, syntax errors, random word insertions, spelling mistakes, and incomplete sentences may be an occasional consequence of this system secondary to software limitations with voice recognition, ambient noise, and hardware issues. If you have any questions or  concerns about the content, text, or information contained within the body of this dictation, please contact the provider for clarification.          [1]   Past Medical History:  Diagnosis Date    Generalized anxiety disorder 10/29/2014   [2]   Past Surgical History:  Procedure Laterality Date    CT NEEDLE BIOPSY LUNG  2/8/2023   [3] No Known Allergies  [4] No family history on file.

## 2025-08-18 ENCOUNTER — TELEPHONE (OUTPATIENT)
Age: 65
End: 2025-08-18